# Patient Record
Sex: FEMALE | Race: WHITE | NOT HISPANIC OR LATINO | Employment: UNEMPLOYED | URBAN - METROPOLITAN AREA
[De-identification: names, ages, dates, MRNs, and addresses within clinical notes are randomized per-mention and may not be internally consistent; named-entity substitution may affect disease eponyms.]

---

## 2017-03-21 ENCOUNTER — OFFICE VISIT - HEALTHEAST (OUTPATIENT)
Dept: INTERNAL MEDICINE | Facility: CLINIC | Age: 57
End: 2017-03-21

## 2017-03-21 DIAGNOSIS — Z12.83 SKIN CANCER SCREENING: ICD-10-CM

## 2017-03-21 DIAGNOSIS — E78.5 HLD (HYPERLIPIDEMIA): ICD-10-CM

## 2017-03-21 DIAGNOSIS — Z00.00 ANNUAL PHYSICAL EXAM: ICD-10-CM

## 2017-03-21 LAB
CHOLEST SERPL-MCNC: 253 MG/DL
FASTING STATUS PATIENT QL REPORTED: YES
HDLC SERPL-MCNC: 77 MG/DL
LDLC SERPL CALC-MCNC: 162 MG/DL
TRIGL SERPL-MCNC: 71 MG/DL

## 2017-03-21 ASSESSMENT — MIFFLIN-ST. JEOR: SCORE: 1258.4

## 2017-03-22 ENCOUNTER — COMMUNICATION - HEALTHEAST (OUTPATIENT)
Dept: INTERNAL MEDICINE | Facility: CLINIC | Age: 57
End: 2017-03-22

## 2017-03-24 LAB
HPV INTERPRETATION - HISTORICAL: NORMAL
HPV INTERPRETER - HISTORICAL: NORMAL

## 2017-03-27 LAB
BKR LAB AP ABNORMAL BLEEDING: NO
BKR LAB AP BIRTH CONTROL/HORMONES: NORMAL
BKR LAB AP CERVICAL APPEARANCE: NORMAL
BKR LAB AP GYN ADEQUACY: NORMAL
BKR LAB AP GYN INTERPRETATION: NORMAL
BKR LAB AP HPV REFLEX: NORMAL
BKR LAB AP LMP: NORMAL
BKR LAB AP PATIENT STATUS: NORMAL
BKR LAB AP PREVIOUS ABNORMAL: NORMAL
BKR LAB AP PREVIOUS NORMAL: 2011
HIGH RISK?: NO
PATH REPORT.COMMENTS IMP SPEC: NORMAL
RESULT FLAG (HE HISTORICAL CONVERSION): NORMAL

## 2017-06-14 ENCOUNTER — COMMUNICATION - HEALTHEAST (OUTPATIENT)
Dept: INTERNAL MEDICINE | Facility: CLINIC | Age: 57
End: 2017-06-14

## 2017-06-28 ENCOUNTER — COMMUNICATION - HEALTHEAST (OUTPATIENT)
Dept: ADMINISTRATIVE | Facility: CLINIC | Age: 57
End: 2017-06-28

## 2018-02-01 ENCOUNTER — COMMUNICATION - HEALTHEAST (OUTPATIENT)
Dept: INTERNAL MEDICINE | Facility: CLINIC | Age: 58
End: 2018-02-01

## 2018-02-21 ENCOUNTER — COMMUNICATION - HEALTHEAST (OUTPATIENT)
Dept: INTERNAL MEDICINE | Facility: CLINIC | Age: 58
End: 2018-02-21

## 2018-03-29 ENCOUNTER — COMMUNICATION - HEALTHEAST (OUTPATIENT)
Dept: INTERNAL MEDICINE | Facility: CLINIC | Age: 58
End: 2018-03-29

## 2018-09-28 ENCOUNTER — HOSPITAL ENCOUNTER (OUTPATIENT)
Dept: MAMMOGRAPHY | Facility: CLINIC | Age: 58
Discharge: HOME | End: 2018-09-28

## 2018-09-28 DIAGNOSIS — Z12.31 VISIT FOR SCREENING MAMMOGRAM: ICD-10-CM

## 2019-01-04 ENCOUNTER — COMMUNICATION - HEALTHEAST (OUTPATIENT)
Dept: SCHEDULING | Facility: CLINIC | Age: 59
End: 2019-01-04

## 2019-01-08 ENCOUNTER — OFFICE VISIT - HEALTHEAST (OUTPATIENT)
Dept: INTERNAL MEDICINE | Facility: CLINIC | Age: 59
End: 2019-01-08

## 2019-01-08 DIAGNOSIS — I10 BENIGN ESSENTIAL HYPERTENSION: ICD-10-CM

## 2019-01-08 LAB
ANION GAP SERPL CALCULATED.3IONS-SCNC: 12 MMOL/L (ref 5–18)
BASOPHILS # BLD AUTO: 0 THOU/UL (ref 0–0.2)
BASOPHILS NFR BLD AUTO: 1 % (ref 0–2)
BUN SERPL-MCNC: 22 MG/DL (ref 8–22)
CALCIUM SERPL-MCNC: 10.1 MG/DL (ref 8.5–10.5)
CHLORIDE BLD-SCNC: 104 MMOL/L (ref 98–107)
CHOLEST SERPL-MCNC: 289 MG/DL
CO2 SERPL-SCNC: 23 MMOL/L (ref 22–31)
CREAT SERPL-MCNC: 0.89 MG/DL (ref 0.6–1.1)
EOSINOPHIL # BLD AUTO: 0.1 THOU/UL (ref 0–0.4)
EOSINOPHIL NFR BLD AUTO: 2 % (ref 0–6)
ERYTHROCYTE [DISTWIDTH] IN BLOOD BY AUTOMATED COUNT: 10.3 % (ref 11–14.5)
FASTING STATUS PATIENT QL REPORTED: NO
GFR SERPL CREATININE-BSD FRML MDRD: >60 ML/MIN/1.73M2
GLUCOSE BLD-MCNC: 100 MG/DL (ref 70–125)
HCT VFR BLD AUTO: 45.4 % (ref 35–47)
HDLC SERPL-MCNC: 71 MG/DL
HGB BLD-MCNC: 15.3 G/DL (ref 12–16)
LDLC SERPL CALC-MCNC: 189 MG/DL
LYMPHOCYTES # BLD AUTO: 1.2 THOU/UL (ref 0.8–4.4)
LYMPHOCYTES NFR BLD AUTO: 21 % (ref 20–40)
MCH RBC QN AUTO: 32 PG (ref 27–34)
MCHC RBC AUTO-ENTMCNC: 33.7 G/DL (ref 32–36)
MCV RBC AUTO: 95 FL (ref 80–100)
MONOCYTES # BLD AUTO: 0.5 THOU/UL (ref 0–0.9)
MONOCYTES NFR BLD AUTO: 9 % (ref 2–10)
NEUTROPHILS # BLD AUTO: 3.9 THOU/UL (ref 2–7.7)
NEUTROPHILS NFR BLD AUTO: 68 % (ref 50–70)
PLATELET # BLD AUTO: 215 THOU/UL (ref 140–440)
PMV BLD AUTO: 7.7 FL (ref 7–10)
POTASSIUM BLD-SCNC: 4.5 MMOL/L (ref 3.5–5)
RBC # BLD AUTO: 4.78 MILL/UL (ref 3.8–5.4)
SODIUM SERPL-SCNC: 139 MMOL/L (ref 136–145)
TRIGL SERPL-MCNC: 144 MG/DL
WBC: 5.8 THOU/UL (ref 4–11)

## 2019-01-08 ASSESSMENT — MIFFLIN-ST. JEOR: SCORE: 1279.26

## 2019-01-09 ENCOUNTER — COMMUNICATION - HEALTHEAST (OUTPATIENT)
Dept: INTERNAL MEDICINE | Facility: CLINIC | Age: 59
End: 2019-01-09

## 2019-01-09 DIAGNOSIS — E78.49 OTHER HYPERLIPIDEMIA: ICD-10-CM

## 2019-02-07 ENCOUNTER — OFFICE VISIT - HEALTHEAST (OUTPATIENT)
Dept: INTERNAL MEDICINE | Facility: CLINIC | Age: 59
End: 2019-02-07

## 2019-02-07 DIAGNOSIS — E78.49 OTHER HYPERLIPIDEMIA: ICD-10-CM

## 2019-02-07 DIAGNOSIS — I10 BENIGN ESSENTIAL HYPERTENSION: ICD-10-CM

## 2019-02-26 ENCOUNTER — AMBULATORY - HEALTHEAST (OUTPATIENT)
Dept: LAB | Facility: CLINIC | Age: 59
End: 2019-02-26

## 2019-02-26 DIAGNOSIS — E78.49 OTHER HYPERLIPIDEMIA: ICD-10-CM

## 2019-02-26 DIAGNOSIS — I10 BENIGN ESSENTIAL HYPERTENSION: ICD-10-CM

## 2019-02-26 LAB
ALT SERPL W P-5'-P-CCNC: 24 U/L (ref 0–45)
ANION GAP SERPL CALCULATED.3IONS-SCNC: 12 MMOL/L (ref 5–18)
AST SERPL W P-5'-P-CCNC: 17 U/L (ref 0–40)
BUN SERPL-MCNC: 14 MG/DL (ref 8–22)
CALCIUM SERPL-MCNC: 9.4 MG/DL (ref 8.5–10.5)
CHLORIDE BLD-SCNC: 106 MMOL/L (ref 98–107)
CHOLEST SERPL-MCNC: 219 MG/DL
CO2 SERPL-SCNC: 23 MMOL/L (ref 22–31)
CREAT SERPL-MCNC: 0.87 MG/DL (ref 0.6–1.1)
FASTING STATUS PATIENT QL REPORTED: YES
GFR SERPL CREATININE-BSD FRML MDRD: >60 ML/MIN/1.73M2
GLUCOSE BLD-MCNC: 85 MG/DL (ref 70–125)
HDLC SERPL-MCNC: 73 MG/DL
LDLC SERPL CALC-MCNC: 112 MG/DL
POTASSIUM BLD-SCNC: 5 MMOL/L (ref 3.5–5)
SODIUM SERPL-SCNC: 141 MMOL/L (ref 136–145)
TRIGL SERPL-MCNC: 172 MG/DL

## 2019-02-27 ENCOUNTER — COMMUNICATION - HEALTHEAST (OUTPATIENT)
Dept: INTERNAL MEDICINE | Facility: CLINIC | Age: 59
End: 2019-02-27

## 2019-04-24 ENCOUNTER — COMMUNICATION - HEALTHEAST (OUTPATIENT)
Dept: INTERNAL MEDICINE | Facility: CLINIC | Age: 59
End: 2019-04-24

## 2019-04-25 ENCOUNTER — COMMUNICATION - HEALTHEAST (OUTPATIENT)
Dept: PEDIATRICS | Facility: CLINIC | Age: 59
End: 2019-04-25

## 2019-04-25 ENCOUNTER — AMBULATORY - HEALTHEAST (OUTPATIENT)
Dept: NURSING | Facility: CLINIC | Age: 59
End: 2019-04-25

## 2019-07-28 ENCOUNTER — COMMUNICATION - HEALTHEAST (OUTPATIENT)
Dept: INTERNAL MEDICINE | Facility: CLINIC | Age: 59
End: 2019-07-28

## 2019-07-28 DIAGNOSIS — I10 BENIGN ESSENTIAL HYPERTENSION: ICD-10-CM

## 2019-08-12 ENCOUNTER — COMMUNICATION - HEALTHEAST (OUTPATIENT)
Dept: INTERNAL MEDICINE | Facility: CLINIC | Age: 59
End: 2019-08-12

## 2019-08-12 DIAGNOSIS — E78.49 OTHER HYPERLIPIDEMIA: ICD-10-CM

## 2019-10-24 ENCOUNTER — COMMUNICATION - HEALTHEAST (OUTPATIENT)
Dept: INTERNAL MEDICINE | Facility: CLINIC | Age: 59
End: 2019-10-24

## 2019-10-24 DIAGNOSIS — I10 BENIGN ESSENTIAL HYPERTENSION: ICD-10-CM

## 2020-02-05 ENCOUNTER — COMMUNICATION - HEALTHEAST (OUTPATIENT)
Dept: INTERNAL MEDICINE | Facility: CLINIC | Age: 60
End: 2020-02-05

## 2020-02-05 DIAGNOSIS — I10 BENIGN ESSENTIAL HYPERTENSION: ICD-10-CM

## 2020-02-05 DIAGNOSIS — E78.49 OTHER HYPERLIPIDEMIA: ICD-10-CM

## 2020-02-24 ENCOUNTER — AMBULATORY - HEALTHEAST (OUTPATIENT)
Dept: INTERNAL MEDICINE | Facility: CLINIC | Age: 60
End: 2020-02-24

## 2020-02-26 ENCOUNTER — OFFICE VISIT - HEALTHEAST (OUTPATIENT)
Dept: INTERNAL MEDICINE | Facility: CLINIC | Age: 60
End: 2020-02-26

## 2020-02-26 DIAGNOSIS — Z12.11 SCREEN FOR COLON CANCER: ICD-10-CM

## 2020-02-26 DIAGNOSIS — I10 ESSENTIAL HYPERTENSION: ICD-10-CM

## 2020-02-26 DIAGNOSIS — E78.49 OTHER HYPERLIPIDEMIA: ICD-10-CM

## 2020-02-26 DIAGNOSIS — Z00.00 ANNUAL PHYSICAL EXAM: ICD-10-CM

## 2020-02-26 LAB
ALT SERPL W P-5'-P-CCNC: 18 U/L (ref 0–45)
ANION GAP SERPL CALCULATED.3IONS-SCNC: 11 MMOL/L (ref 5–18)
AST SERPL W P-5'-P-CCNC: 15 U/L (ref 0–40)
BUN SERPL-MCNC: 15 MG/DL (ref 8–22)
CALCIUM SERPL-MCNC: 9.7 MG/DL (ref 8.5–10.5)
CHLORIDE BLD-SCNC: 103 MMOL/L (ref 98–107)
CHOLEST SERPL-MCNC: 238 MG/DL
CO2 SERPL-SCNC: 25 MMOL/L (ref 22–31)
CREAT SERPL-MCNC: 0.84 MG/DL (ref 0.6–1.1)
FASTING STATUS PATIENT QL REPORTED: YES
GFR SERPL CREATININE-BSD FRML MDRD: >60 ML/MIN/1.73M2
GLUCOSE BLD-MCNC: 84 MG/DL (ref 70–125)
HDLC SERPL-MCNC: 69 MG/DL
LDLC SERPL CALC-MCNC: 141 MG/DL
POTASSIUM BLD-SCNC: 4.8 MMOL/L (ref 3.5–5)
SODIUM SERPL-SCNC: 139 MMOL/L (ref 136–145)
TRIGL SERPL-MCNC: 139 MG/DL

## 2020-02-26 ASSESSMENT — MIFFLIN-ST. JEOR: SCORE: 1252.05

## 2020-04-10 ENCOUNTER — AMBULATORY - HEALTHEAST (OUTPATIENT)
Dept: INTERNAL MEDICINE | Facility: CLINIC | Age: 60
End: 2020-04-10

## 2020-04-10 ENCOUNTER — COMMUNICATION - HEALTHEAST (OUTPATIENT)
Dept: INTERNAL MEDICINE | Facility: CLINIC | Age: 60
End: 2020-04-10

## 2020-04-10 DIAGNOSIS — E78.49 OTHER HYPERLIPIDEMIA: ICD-10-CM

## 2020-07-09 ENCOUNTER — COMMUNICATION - HEALTHEAST (OUTPATIENT)
Dept: INTERNAL MEDICINE | Facility: CLINIC | Age: 60
End: 2020-07-09

## 2020-07-10 ENCOUNTER — AMBULATORY - HEALTHEAST (OUTPATIENT)
Dept: LAB | Facility: CLINIC | Age: 60
End: 2020-07-10

## 2020-07-10 DIAGNOSIS — E78.49 OTHER HYPERLIPIDEMIA: ICD-10-CM

## 2020-07-10 LAB
ALT SERPL W P-5'-P-CCNC: 19 U/L (ref 0–45)
AST SERPL W P-5'-P-CCNC: 16 U/L (ref 0–40)
CHOLEST SERPL-MCNC: 217 MG/DL
FASTING STATUS PATIENT QL REPORTED: YES
HDLC SERPL-MCNC: 70 MG/DL
LDLC SERPL CALC-MCNC: 103 MG/DL
TRIGL SERPL-MCNC: 221 MG/DL

## 2020-07-13 ENCOUNTER — AMBULATORY - HEALTHEAST (OUTPATIENT)
Dept: INTERNAL MEDICINE | Facility: CLINIC | Age: 60
End: 2020-07-13

## 2020-07-13 DIAGNOSIS — E78.49 OTHER HYPERLIPIDEMIA: ICD-10-CM

## 2021-03-25 ENCOUNTER — HOSPITAL ENCOUNTER (OUTPATIENT)
Dept: MAMMOGRAPHY | Facility: CLINIC | Age: 61
Discharge: HOME | End: 2021-03-25

## 2021-03-25 DIAGNOSIS — Z12.31 VISIT FOR SCREENING MAMMOGRAM: ICD-10-CM

## 2021-03-26 ENCOUNTER — COMMUNICATION - HEALTHEAST (OUTPATIENT)
Dept: INTERNAL MEDICINE | Facility: CLINIC | Age: 61
End: 2021-03-26

## 2021-03-31 ENCOUNTER — OFFICE VISIT - HEALTHEAST (OUTPATIENT)
Dept: INTERNAL MEDICINE | Facility: CLINIC | Age: 61
End: 2021-03-31

## 2021-03-31 DIAGNOSIS — I10 ESSENTIAL HYPERTENSION: ICD-10-CM

## 2021-03-31 DIAGNOSIS — Z12.11 SCREEN FOR COLON CANCER: ICD-10-CM

## 2021-03-31 DIAGNOSIS — N62 LARGE BREASTS: ICD-10-CM

## 2021-03-31 DIAGNOSIS — Z00.00 ANNUAL PHYSICAL EXAM: ICD-10-CM

## 2021-03-31 DIAGNOSIS — E78.49 OTHER HYPERLIPIDEMIA: ICD-10-CM

## 2021-03-31 LAB
ALBUMIN SERPL-MCNC: 4.2 G/DL (ref 3.5–5)
ALP SERPL-CCNC: 84 U/L (ref 45–120)
ALT SERPL W P-5'-P-CCNC: 20 U/L (ref 0–45)
ANION GAP SERPL CALCULATED.3IONS-SCNC: 11 MMOL/L (ref 5–18)
AST SERPL W P-5'-P-CCNC: 17 U/L (ref 0–40)
BILIRUB DIRECT SERPL-MCNC: 0.1 MG/DL
BILIRUB SERPL-MCNC: 0.5 MG/DL (ref 0–1)
BUN SERPL-MCNC: 19 MG/DL (ref 8–22)
CALCIUM SERPL-MCNC: 9.5 MG/DL (ref 8.5–10.5)
CHLORIDE BLD-SCNC: 102 MMOL/L (ref 98–107)
CHOLEST SERPL-MCNC: 222 MG/DL
CO2 SERPL-SCNC: 26 MMOL/L (ref 22–31)
CREAT SERPL-MCNC: 0.84 MG/DL (ref 0.6–1.1)
FASTING STATUS PATIENT QL REPORTED: NO
GFR SERPL CREATININE-BSD FRML MDRD: >60 ML/MIN/1.73M2
GLUCOSE BLD-MCNC: 105 MG/DL (ref 70–125)
HDLC SERPL-MCNC: 69 MG/DL
LDLC SERPL CALC-MCNC: 117 MG/DL
POTASSIUM BLD-SCNC: 4.1 MMOL/L (ref 3.5–5)
PROT SERPL-MCNC: 7.1 G/DL (ref 6–8)
SODIUM SERPL-SCNC: 139 MMOL/L (ref 136–145)
TRIGL SERPL-MCNC: 182 MG/DL

## 2021-03-31 ASSESSMENT — MIFFLIN-ST. JEOR: SCORE: 1256.58

## 2021-04-02 ENCOUNTER — AMBULATORY - HEALTHEAST (OUTPATIENT)
Dept: NURSING | Facility: CLINIC | Age: 61
End: 2021-04-02

## 2021-04-05 ENCOUNTER — TRANSCRIBE ORDERS (OUTPATIENT)
Dept: OTHER | Age: 61
End: 2021-04-05

## 2021-04-05 DIAGNOSIS — N62 LARGE BREASTS: Primary | ICD-10-CM

## 2021-04-08 ENCOUNTER — COMMUNICATION - HEALTHEAST (OUTPATIENT)
Dept: INTERNAL MEDICINE | Facility: CLINIC | Age: 61
End: 2021-04-08

## 2021-04-08 DIAGNOSIS — E78.49 OTHER HYPERLIPIDEMIA: ICD-10-CM

## 2021-04-08 DIAGNOSIS — I10 ESSENTIAL HYPERTENSION: ICD-10-CM

## 2021-04-08 RX ORDER — LISINOPRIL 10 MG/1
10 TABLET ORAL DAILY
Qty: 90 TABLET | Refills: 3 | Status: SHIPPED | OUTPATIENT
Start: 2021-04-08 | End: 2022-04-18

## 2021-04-08 RX ORDER — ATORVASTATIN CALCIUM 40 MG/1
40 TABLET, FILM COATED ORAL DAILY
Qty: 90 TABLET | Refills: 3 | Status: SHIPPED | OUTPATIENT
Start: 2021-04-08 | End: 2022-04-18

## 2021-04-23 ENCOUNTER — AMBULATORY - HEALTHEAST (OUTPATIENT)
Dept: NURSING | Facility: CLINIC | Age: 61
End: 2021-04-23

## 2021-05-25 ENCOUNTER — RECORDS - HEALTHEAST (OUTPATIENT)
Dept: ADMINISTRATIVE | Facility: CLINIC | Age: 61
End: 2021-05-25

## 2021-05-28 NOTE — TELEPHONE ENCOUNTER
New Appointment Needed  What is the reason for the visit:    2nd Shingrix     Provider Preference: Nurse only visit     How soon do you need to be seen?: Asap    Waitlist offered?: No    Okay to leave a detailed message:  Yes

## 2021-05-28 NOTE — TELEPHONE ENCOUNTER
Patient Returning Call  Reason for call:  Patient called back  Information relayed to patient:  Relayed message below, and assisted in scheduling patient for a nurse only, today at 4:40pm  Patient has additional questions:  No  If YES, what are your questions/concerns:  N/A  Okay to leave a detailed message?: No call back needed

## 2021-05-28 NOTE — TELEPHONE ENCOUNTER
Called and Left message for pt to call back - please request a time and date that patient can come in on Tues-thurs - no nurse visits on Monday or Friday.  Pt can be scheduled for [facility] if able to do so.  Please send to clinic if unable to assist.

## 2021-05-30 VITALS — HEIGHT: 64 IN | WEIGHT: 156.4 LBS | BODY MASS INDEX: 26.7 KG/M2

## 2021-05-30 NOTE — TELEPHONE ENCOUNTER
Refill Approved    Rx renewed per Medication Renewal Policy. Medication was last renewed on 2/7/19.    Stefani Malloy, [facility] Connection Triage/Med Refill 7/28/2019     Requested Prescriptions   Pending Prescriptions Disp Refills     lisinopril (PRINIVIL,ZESTRIL) 10 MG tablet [Pharmacy Med Name: LISINOPRIL 10 MG TABLET] 90 tablet 1     Sig: TAKE 1 TABLET BY MOUTH EVERY DAY       Ace Inhibitors Refill Protocol Passed - 7/28/2019  8:45 AM        Passed - PCP or prescribing provider visit in past 12 months       Last office visit with prescriber/PCP: 2/7/2019 Anusha Fermin FNP OR same dept: 2/7/2019 Anusha Fermin FNP OR same specialty: 2/7/2019 Anusha Fermin FNP  Last physical: 3/21/2017 Last MTM visit: Visit date not found   Next visit within 3 mo: Visit date not found  Next physical within 3 mo: Visit date not found  Prescriber OR PCP: JOSE ANTONIO Serrano  Last diagnosis associated with med order: 1. Benign essential hypertension  - lisinopril (PRINIVIL,ZESTRIL) 10 MG tablet [Pharmacy Med Name: LISINOPRIL 10 MG TABLET]; TAKE 1 TABLET BY MOUTH EVERY DAY  Dispense: 90 tablet; Refill: 1    If protocol passes may refill for 12 months if within 3 months of last provider visit (or a total of 15 months).             Passed - Serum Potassium in past 12 months     Lab Results   Component Value Date    Potassium 5.0 02/26/2019             Passed - Blood pressure filed in past 12 months     BP Readings from Last 1 Encounters:   02/07/19 132/80             Passed - Serum Creatinine in past 12 months     Creatinine   Date Value Ref Range Status   02/26/2019 0.87 0.60 - 1.10 mg/dL Final

## 2021-05-31 NOTE — TELEPHONE ENCOUNTER
Refill Approved    Rx renewed per Medication Renewal Policy. Medication was last renewed on 1/9/19  #90 R-1.    Last OV 2/7/19    Sally Duke, [facility] Connection Triage/Med Refill 8/12/2019     Requested Prescriptions   Pending Prescriptions Disp Refills     atorvastatin (LIPITOR) 20 MG tablet 90 tablet 1     Sig: Take 1 tablet (20 mg total) by mouth daily.       Statins Refill Protocol (Hmg CoA Reductase Inhibitors) Passed - 8/12/2019  3:52 PM        Passed - PCP or prescribing provider visit in past 12 months      Last office visit with prescriber/PCP: 2/7/2019 Anusha Fermin FNP OR same dept: 2/7/2019 Anusha Fermin FNP OR same specialty: 2/7/2019 Anusha Fermin FNP  Last physical: 3/21/2017 Last MTM visit: Visit date not found   Next visit within 3 mo: Visit date not found  Next physical within 3 mo: Visit date not found  Prescriber OR PCP: JOSE ANTONIO Serrano  Last diagnosis associated with med order: 1. Other hyperlipidemia  - atorvastatin (LIPITOR) 20 MG tablet; Take 1 tablet (20 mg total) by mouth daily.  Dispense: 90 tablet; Refill: 1    If protocol passes may refill for 12 months if within 3 months of last provider visit (or a total of 15 months).

## 2021-06-02 VITALS — HEIGHT: 64 IN | BODY MASS INDEX: 27.49 KG/M2 | WEIGHT: 161 LBS

## 2021-06-02 VITALS — WEIGHT: 159 LBS | BODY MASS INDEX: 27.08 KG/M2

## 2021-06-02 NOTE — TELEPHONE ENCOUNTER
Refill Approved    Rx renewed per Medication Renewal Policy. Medication was last renewed on 7/28/19.    Frida Cordoba, [facility] Connection Triage/Med Refill 10/24/2019     Requested Prescriptions   Pending Prescriptions Disp Refills     lisinopril (PRINIVIL,ZESTRIL) 10 MG tablet [Pharmacy Med Name: LISINOPRIL 10 MG TABLET] 90 tablet 0     Sig: TAKE 1 TABLET BY MOUTH EVERY DAY       Ace Inhibitors Refill Protocol Passed - 10/24/2019  2:17 AM        Passed - PCP or prescribing provider visit in past 12 months       Last office visit with prescriber/PCP: 2/7/2019 Anusha Fermin FNP OR same dept: 2/7/2019 Anusha Fermin FNP OR same specialty: 2/7/2019 Anusha Fermin FNP  Last physical: 3/21/2017 Last MTM visit: Visit date not found   Next visit within 3 mo: Visit date not found  Next physical within 3 mo: Visit date not found  Prescriber OR PCP: JOSE ANTONIO Serrano  Last diagnosis associated with med order: 1. Benign essential hypertension  - lisinopril (PRINIVIL,ZESTRIL) 10 MG tablet [Pharmacy Med Name: LISINOPRIL 10 MG TABLET]; TAKE 1 TABLET BY MOUTH EVERY DAY  Dispense: 90 tablet; Refill: 0    If protocol passes may refill for 12 months if within 3 months of last provider visit (or a total of 15 months).             Passed - Serum Potassium in past 12 months     Lab Results   Component Value Date    Potassium 5.0 02/26/2019             Passed - Blood pressure filed in past 12 months     BP Readings from Last 1 Encounters:   02/07/19 132/80             Passed - Serum Creatinine in past 12 months     Creatinine   Date Value Ref Range Status   02/26/2019 0.87 0.60 - 1.10 mg/dL Final

## 2021-06-04 VITALS
HEART RATE: 70 BPM | DIASTOLIC BLOOD PRESSURE: 80 MMHG | HEIGHT: 64 IN | WEIGHT: 155 LBS | SYSTOLIC BLOOD PRESSURE: 126 MMHG | BODY MASS INDEX: 26.46 KG/M2

## 2021-06-05 VITALS
HEART RATE: 74 BPM | DIASTOLIC BLOOD PRESSURE: 82 MMHG | SYSTOLIC BLOOD PRESSURE: 128 MMHG | BODY MASS INDEX: 26.63 KG/M2 | HEIGHT: 64 IN | WEIGHT: 156 LBS

## 2021-06-05 NOTE — TELEPHONE ENCOUNTER
Due to be seen with labs    Rx renewed per Medication Renewal Policy. Medication was last renewed on 8/12/19.10/24/19.    Frida Cordoba, [facility] Connection Triage/Med Refill 2/5/2020     Requested Prescriptions   Pending Prescriptions Disp Refills     lisinopril (PRINIVIL,ZESTRIL) 10 MG tablet [Pharmacy Med Name: LISINOPRIL 10 MG TABLET] 90 tablet 0     Sig: TAKE 1 TABLET BY MOUTH EVERY DAY       Ace Inhibitors Refill Protocol Passed - 2/5/2020  3:22 AM        Passed - PCP or prescribing provider visit in past 12 months       Last office visit with prescriber/PCP: 2/7/2019 Anusha Fermin FNP OR same dept: 2/7/2019 Anusha Fermin FNP OR same specialty: 2/7/2019 Anusha Fermin FNP  Last physical: 3/21/2017 Last MTM visit: Visit date not found   Next visit within 3 mo: Visit date not found  Next physical within 3 mo: Visit date not found  Prescriber OR PCP: JOSE ANTONIO Serrano  Last diagnosis associated with med order: 1. Benign essential hypertension  - lisinopril (PRINIVIL,ZESTRIL) 10 MG tablet [Pharmacy Med Name: LISINOPRIL 10 MG TABLET]; TAKE 1 TABLET BY MOUTH EVERY DAY  Dispense: 90 tablet; Refill: 0    2. Other hyperlipidemia  - atorvastatin (LIPITOR) 20 MG tablet [Pharmacy Med Name: ATORVASTATIN 20 MG TABLET]; TAKE 1 TABLET BY MOUTH EVERY DAY  Dispense: 90 tablet; Refill: 1    If protocol passes may refill for 12 months if within 3 months of last provider visit (or a total of 15 months).             Passed - Serum Potassium in past 12 months     Lab Results   Component Value Date    Potassium 5.0 02/26/2019             Passed - Blood pressure filed in past 12 months     BP Readings from Last 1 Encounters:   02/07/19 132/80             Passed - Serum Creatinine in past 12 months     Creatinine   Date Value Ref Range Status   02/26/2019 0.87 0.60 - 1.10 mg/dL Final             atorvastatin (LIPITOR) 20 MG tablet [Pharmacy Med Name: ATORVASTATIN 20 MG TABLET] 90 tablet 1     Sig: TAKE 1 TABLET BY MOUTH  EVERY DAY       Statins Refill Protocol (Hmg CoA Reductase Inhibitors) Passed - 2/5/2020  3:22 AM        Passed - PCP or prescribing provider visit in past 12 months      Last office visit with prescriber/PCP: 2/7/2019 Anusha Fermin FNP OR same dept: 2/7/2019 Anusha Fermin FNP OR same specialty: 2/7/2019 Anusha Fermin FNP  Last physical: 3/21/2017 Last MTM visit: Visit date not found   Next visit within 3 mo: Visit date not found  Next physical within 3 mo: Visit date not found  Prescriber OR PCP: JOSE ANTONIO Serrano  Last diagnosis associated with med order: 1. Benign essential hypertension  - lisinopril (PRINIVIL,ZESTRIL) 10 MG tablet [Pharmacy Med Name: LISINOPRIL 10 MG TABLET]; TAKE 1 TABLET BY MOUTH EVERY DAY  Dispense: 90 tablet; Refill: 0    2. Other hyperlipidemia  - atorvastatin (LIPITOR) 20 MG tablet [Pharmacy Med Name: ATORVASTATIN 20 MG TABLET]; TAKE 1 TABLET BY MOUTH EVERY DAY  Dispense: 90 tablet; Refill: 1    If protocol passes may refill for 12 months if within 3 months of last provider visit (or a total of 15 months).

## 2021-06-05 NOTE — TELEPHONE ENCOUNTER
Please assist patient with scheduling appointment with her primary for future refills and lab work

## 2021-06-06 NOTE — TELEPHONE ENCOUNTER
Called patient- medications were sent to pharmacy on 2/5/2020. Patient understood and thanked for the call.

## 2021-06-06 NOTE — PROGRESS NOTES
Assessment/Plan:     1. Annual physical exam  - Reviewed the importance of monitoring for changes in breast appearance such as nipple inversion, changes in breast tissue texture, non-healing wounds, or nipple discharge   - regular exercise/healthy diet encouraged   - ALT (SGPT)  - AST (SGOT)  - Basic Metabolic Panel  - Lipid Profile    2. Essential hypertension  Stable   - Basic Metabolic Panel    3. Other hyperlipidemia  Continue statin  - Lipid Profile  - atorvastatin (LIPITOR) 20 MG tablet; Take 1 tablet (20 mg total) by mouth daily.  Dispense: 90 tablet; Refill: 3    4. Screen for colon cancer  - Ambulatory referral for Colonoscopy        Subjective:     Monse Park is a 59 y.o. female who presents for an annual exam and medications refilled.     Blood pressure is well controlled with current medication. No lightheadedness or dizziness associated with treatment.     She continues a statin for HLD, no myalgias associated with the medication.     The patient reports that there is not domestic violence in her life.     Healthy Habits:   Regular Exercise: Yes, swims regularly   Sunscreen Use: Yes, face makeup with sunscreen. Dermatology check last year   Healthy Diet: Yes  Dental Visits Regularly: Yes  Mammogram: 9/28/18      Health Maintenance   Topic Date Due     PREVENTIVE CARE VISIT  03/21/2018     COLONOSCOPY  04/23/2020     MAMMOGRAM  09/28/2020     TD 18+ HE  03/01/2021     PAP SMEAR  03/21/2022     HPV TEST  03/21/2022     ADVANCE CARE PLANNING  02/07/2024     LIPID  02/26/2025     INFLUENZA VACCINE RULE BASED  Completed     TDAP ADULT ONE TIME DOSE  Completed     ZOSTER VACCINES  Completed     HEPATITIS C SCREENING  Discontinued     HIV SCREENING  Discontinued       Immunization History   Administered Date(s) Administered     DT (pediatric) 01/01/1998     Influenza, inj, historic,unspecified 08/30/2018     Td,adult,historic,unspecified 01/01/1998     Tdap 03/01/2011     ZOSTER, RECOMBINANT, IM  2019, 2019     Immunization status: up to date and documented.    Gynecologic History  Patient's last menstrual period was 2013.  Last Pap: 2017. Results were: normal HPV testing: negative    OB History    Para Term  AB Living   3 3 3         SAB TAB Ectopic Multiple Live Births                  # Outcome Date GA Lbr Pillo/2nd Weight Sex Delivery Anes PTL Lv   3 Term            2 Term            1 Term                Current Outpatient Medications   Medication Sig Dispense Refill     atorvastatin (LIPITOR) 20 MG tablet Take 1 tablet (20 mg total) by mouth daily. 90 tablet 3     lisinopriL (PRINIVIL,ZESTRIL) 10 MG tablet Take 1 tablet (10 mg total) by mouth daily. 90 tablet 3     UNABLE TO FIND Med Name: [employer] hair and nails       No current facility-administered medications for this visit.      Past Medical History:   Diagnosis Date     Fibrocystic breast      Past Surgical History:   Procedure Laterality Date     no surgical history       Patient has no known allergies.  Family History   Problem Relation Age of Onset     Heart disease Father      Hypertension Mother      Pacemaker Mother      Hypertension Sister      No Medical Problems Daughter      No Medical Problems Maternal Grandmother      No Medical Problems Maternal Grandfather      No Medical Problems Paternal Grandmother      No Medical Problems Paternal Grandfather      No Medical Problems Maternal Aunt      No Medical Problems Paternal Aunt      Hypertension Brother      Hypertension Sister      Hypertension Sister      Hypertension Sister      No Medical Problems Sister      Hypertension Brother      Social History     Socioeconomic History     Marital status:      Spouse name: Not on file     Number of children: Not on file     Years of education: Not on file     Highest education level: Not on file   Occupational History     Not on file   Social Needs     Financial resource strain: Not on file     Food  insecurity:     Worry: Not on file     Inability: Not on file     Transportation needs:     Medical: Not on file     Non-medical: Not on file   Tobacco Use     Smoking status: Former Smoker     Packs/day: 0.25     Years: 30.00     Pack years: 7.50     Last attempt to quit: 2009     Years since quittin.1     Smokeless tobacco: Never Used   Substance and Sexual Activity     Alcohol use: Yes     Alcohol/week: 7.0 standard drinks     Types: 7 Glasses of wine per week     Comment: socially on weekend     Drug use: No     Sexual activity: Yes     Partners: Male     Birth control/protection: Post-menopausal   Lifestyle     Physical activity:     Days per week: Not on file     Minutes per session: Not on file     Stress: Not on file   Relationships     Social connections:     Talks on phone: Not on file     Gets together: Not on file     Attends Sabianist service: Not on file     Active member of club or organization: Not on file     Attends meetings of clubs or organizations: Not on file     Relationship status: Not on file     Intimate partner violence:     Fear of current or ex partner: Not on file     Emotionally abused: Not on file     Physically abused: Not on file     Forced sexual activity: Not on file   Other Topics Concern     Not on file   Social History Narrative     Not on file       Review of Systems  General:  Negative except as noted above  Eyes: Negative except as noted above  Ears/Nose/Throat: Negative except as noted above  Cardiovascular: Negative except as noted above  Respiratory:  Negative except as noted above  Gastrointestinal:  Negative except as noted above  Musculoskeletal:  Negative except as noted above  Skin: Negative except as noted above  Neurologic: Negative except as noted above  Psychiatric: Negative except as noted above  Endocrine: Negative except as noted above  Heme/Lymphatic: Negative except as noted above   Allergic/Immunologic: Negative except as noted  "above      Objective:      Vitals:    02/26/20 0725   BP: 126/80   Pulse: 70   Weight: 155 lb (70.3 kg)   Height: 5' 4.25\" (1.632 m)     Wt Readings from Last 3 Encounters:   02/26/20 155 lb (70.3 kg)   02/07/19 159 lb (72.1 kg)   01/08/19 161 lb (73 kg)     Body mass index is 26.4 kg/m .     Physical Exam:  General Appearance: Alert, cooperative, no distress.  Head: Normocephalic, without obvious abnormality, atraumatic  Eyes: PERRL, conjunctiva/corneas clear, EOM's intact  Ears: Normal TM's and external ear canals, both ears  Throat: Lips, mucosa, and tongue normal  Neck: Supple, symmetrical, trachea midline, no adenopathy;  thyroid: not enlarged, symmetric, no tenderness/mass/nodules  Lungs: Clear to auscultation bilaterally, respirations unlabored  Breasts: No breast masses, tenderness, asymmetry, or nipple discharge.  Heart: Regular rate and rhythm, S1 and S2 normal, no murmur, rub, or gallop  Abdomen: Soft, non-tender, bowel sounds active all four quadrants,  no masses, no organomegaly  Extremities: Extremities normal, atraumatic, no cyanosis or edema  Skin: Skin color, texture, turgor normal, no rashes or lesions  Lymph nodes: Cervical, supraclavicular, and axillary nodes normal  Neurologic: Normal  Psych: Normal affect.  Does not appear anxious or depressed.         "

## 2021-06-06 NOTE — TELEPHONE ENCOUNTER
Who is calling:  Patient   Reason for Call:  Caller stated that she had set up appointment for a office visit with Anusha Fermin FNP. Caller stated that will be needing a refill to cover until the actual office visit date which is on 02/26/20.   Date of last appointment with primary care: n/a  Okay to leave a detailed message: Yes  [phone]

## 2021-06-07 NOTE — TELEPHONE ENCOUNTER
Call patient: let her know I called in atorvastatin 40 mg daily. Note that this is a change in dose so she will need to take just 1 pill per day. Also, I would like her to schedule a fasting lab follow up visit in 3 months to make sure that her cholesterol has improved and that she is tolerating the medication well.

## 2021-06-07 NOTE — TELEPHONE ENCOUNTER
Refill Request  Did you contact pharmacy: Yes  Medication name: atorvastatin (LIPITOR) 20 MG tablet   Medication   Date: 2/26/2020  Department: [facility]  Ordering/Authorizing: Anusha Fermin FNP    Order Providers     Prescribing Provider  Encounter Provider    Anusha Fermin FNP Roth, Megan Carissa, FNP    Outpatient Medication Detail      Disp  Refills  Start  End     atorvastatin (LIPITOR) 20 MG tablet  90 tablet  3  2/26/2020      Sig - Route: Take 1 tablet (20 mg total) by mouth daily. - Oral     Sent to pharmacy as: atorvastatin 20 mg tablet (LIPITOR)     E-Prescribing Status: Receipt confirmed by pharmacy (2/26/2020  7:39 AM CST)     Associated Diagnoses     Other hyperlipidemia        Pharmacy     [place] [address] [place] [facility] - [place], [place] - [address]        Requested Prescriptions      No prescriptions requested or ordered in this encounter     Who prescribed the medication: Anusha Fermin  Requested Pharmacy: [facility]  Is patient out of medication: No.  3 days left, patient reported that she is now taking 2 tablets per day so is running out of medication sooner & pharmacy said it's too soon to refill.  Patient notified refills processed in 3 business days:  yes  Okay to leave a detailed message: yes

## 2021-06-09 NOTE — PROGRESS NOTES
Assessment/Plan:     1. Annual physical exam  2. Skin cancer screening  3. HLD (hyperlipidemia)  - Reviewed the importance of monitoring for changes in breast appearance such as nipple inversion, changes in breast tissue texture, non-healing wounds, or nipple discharge   -Referral to dermatologist for skin cancer screening as per patient preference. No obvious skin lesions or suspicious lesions noted  -Repeat lipid panel and glucose today.  Her goal is to ream remain off of any medications.  We reviewed her last lipid panel did show quite an elevated LDL for which a statin medication would be recommended.  She declines at the current time but will see what her repeat lab test shows.  - The following high BMI interventions were performed this visit: encouragement to exercise   - Follow up in 2 weeks for repeat blood pressure check         Subjective:     Monse Park is a 56 y.o. female who presents for an annual exam.  She does not have any new concerns today.  She is due for repeat Pap smear today.    The patient reports that there is not domestic violence in her life.     Healthy Habits:   Regular Exercise: Yes, 7 days per week. Swims and cardio exercises   Sunscreen Use: Yes  Healthy Diet: Yes  Dental Visits Regularly: Yes  Sexually active: Yes  Mammogram: 2015, mammogram scheduling information provided to patient   Colonoscopy: Yes, in media   Prevention of Osteoporosis: discussed 1200 mg of calcium and adequate vitamin D    Immunization History   Administered Date(s) Administered     DT (pediatric) 1998     Td, historic 1998     Tdap 2011     Immunization status: UTD and documented     Gynecologic History  Patient's last menstrual period was 2013.  Contraception: post menopausal status  Last Pap: . Results were: normal      OB History    Para Term  AB SAB TAB Ectopic Multiple Living   3 3 3             # Outcome Date GA Lbr Pillo/2nd Weight Sex Delivery  Anes PTL Lv   3 Term            2 Term            1 Term                   Current Outpatient Prescriptions   Medication Sig Dispense Refill     CALCIUM CARBONATE/VITAMIN D3 (CALCIUM 500 + D, D3, ORAL) Take 1 tablet by mouth daily.       UNABLE TO FIND Med Name: [employer] hair and nails       No current facility-administered medications for this visit.      Past Medical History:   Diagnosis Date     Fibrocystic breast      Past Surgical History:   Procedure Laterality Date     no surgical history       Review of patient's allergies indicates no known allergies.  Family History   Problem Relation Age of Onset     Heart disease Father      No Medical Problems Mother      No Medical Problems Sister      No Medical Problems Daughter      No Medical Problems Maternal Grandmother      No Medical Problems Maternal Grandfather      No Medical Problems Paternal Grandmother      No Medical Problems Paternal Grandfather      No Medical Problems Maternal Aunt      No Medical Problems Paternal Aunt      BRCA 1/2 Neg Hx      Breast cancer Neg Hx      Cancer Neg Hx      Colon cancer Neg Hx      Endometrial cancer Neg Hx      Ovarian cancer Neg Hx      Social History     Social History     Marital status:      Spouse name: N/A     Number of children: N/A     Years of education: N/A     Occupational History     Not on file.     Social History Main Topics     Smoking status: Former Smoker     Smokeless tobacco: Not on file     Alcohol use Yes      Comment: socially on weekend     Drug use: No     Sexual activity: Not on file     Other Topics Concern     Not on file     Social History Narrative       Review of Systems  General:  Negative except as noted above  Eyes: Negative except as noted above  Ears/Nose/Throat: Negative except as noted above  Cardiovascular: Negative except as noted above  Respiratory:  Negative except as noted above  Gastrointestinal:  Negative except as noted above  Musculoskeletal:  Negative except  "as noted above  Skin: Negative except as noted above  Neurologic: Negative except as noted above  Psychiatric: Negative except as noted above  Endocrine: Negative except as noted above  Heme/Lymphatic: Negative except as noted above   Allergic/Immunologic: Negative except as noted above      Objective:      Vitals:    03/21/17 1444 03/21/17 1524   BP: 150/90 148/90   Patient Site: Right Arm Right Arm   Patient Position: Sitting Sitting   Cuff Size: Adult Regular Adult Regular   Pulse: 67    Weight: 156 lb 6.4 oz (70.9 kg)    Height: 5' 4.25\" (1.632 m)      Wt Readings from Last 3 Encounters:   03/21/17 156 lb 6.4 oz (70.9 kg)   10/20/16 156 lb (70.8 kg)   12/16/15 157 lb 12.8 oz (71.6 kg)     Body mass index is 26.64 kg/(m^2).     Physical Exam:  General Appearance: Alert, cooperative, no distress.  Head: Normocephalic, without obvious abnormality, atraumatic  Eyes: PERRL, conjunctiva/corneas clear, EOM's intact  Ears: Normal TM's and external ear canals, both ears  Nose: Nares normal, septum midline,mucosa normal, no drainage  Throat: Lips, mucosa, and tongue normal  Neck: Supple, symmetrical, trachea midline, no adenopathy;  thyroid: not enlarged, symmetric, no tenderness/mass/nodules  Lungs: Clear to auscultation bilaterally, respirations unlabored  Heart: Regular rate and rhythm, S1 and S2 normal, no murmur, rub, or gallop  Abdomen: Soft, non-tender, bowel sounds active all four quadrants,  no masses, no organomegaly  Pelvic: Genital: EXTERNAL GENITALIA: Normal appearing vulva without masses, tenderness or lesions. PERINEUM: normal and intact. URETHRAL MEATUS: normal VAGINA:  Vagina atrophic with normal color and without discharge or lesions. CERVIX: atrophic appearing cervix without discharge or lesions. Non-friable. No CMT. UTERUS: uterus is normal size, shape, consistency, and non-tender. ADNEXA: no tenderness or fullness.   Extremities: Extremities normal, atraumatic, no cyanosis or edema  Skin: Skin color, " texture, turgor normal, no rashes or lesions  Lymph nodes: Cervical, supraclavicular nodes normal  Neurologic: Normal  Psych: Normal affect.  Does not appear anxious or depressed.

## 2021-06-09 NOTE — TELEPHONE ENCOUNTER
Who is calling:  Patient   Reason for Call:  Caller is needing to know if she is suppose to fast for 12 hrs or 8 hrs before her lab appointment. Caller is needing a call back as soon as possible due to lab appointment is tomorrow morning.   Date of last appointment with primary care:   Okay to leave a detailed message: Yes

## 2021-06-09 NOTE — TELEPHONE ENCOUNTER
Called to patient and told her to fast 8 hours prior to appointment limiting to water and black coffee. Patient thanked for the call

## 2021-06-16 PROBLEM — I10 HTN (HYPERTENSION): Status: ACTIVE | Noted: 2019-02-27

## 2021-06-16 NOTE — PROGRESS NOTES
Assessment/Plan:       Problem List Items Addressed This Visit     HLD (hyperlipidemia)     Continue statin         Relevant Orders    Lipid Profile (Completed)    Hepatic Profile (Completed)    HTN (hypertension)     Stable. Continue current medication         Relevant Orders    Basic Metabolic Panel (Completed)    JIC LAV (Completed)      Other Visit Diagnoses     Annual physical exam    -  Primary    Screen for colon cancer        Relevant Orders    Ambulatory referral for Colonoscopy    Large breasts        Relevant Orders    Ambulatory referral to Plastic Surgery - [facility]           - Reviewed the importance of monitoring for changes in breast appearance such as nipple inversion, changes in breast tissue texture, non-healing wounds, or nipple discharge   - Plant-based diet and 150 minutes of physical activity per week recommended   - Health screenings and vaccines appropriate for age, biological gender, and risk factors reviewed and ordered as per this discussion   - Due for a tetanus booster. Must be given at least 2 weeks prior to or after covid vaccine       Subjective:     Monse Park is a 60 y.o. female who presents for an annual exam.     She notes lower back pain and wonders if her breast size contribute to her back pain. As she ages it seems to her her breasts continue to get bigger. She typically tries to soak with hot water when her back bothers her and does a lot of home exercises and stretching to help with her back.     HTN is reviewed. No chest pain or dyspnea.     The patient reports that there is not domestic violence in her life.     Healthy Habits:   Regular Exercise: Yes, swims regularly  Sunscreen Use: Yes  Healthy Diet: Yes  Dental Visits Regularly: Yes      Health Maintenance   Topic Date Due     COVID-19 Vaccine (1) Never done     COLORECTAL CANCER SCREENING  04/23/2020     TD 18+ HE  03/01/2021     PAP SMEAR  03/21/2022     HPV TEST  03/21/2022      PREVENTIVE CARE VISIT  2022     MAMMOGRAM  2023     ADVANCE CARE PLANNING  2024     LIPID  [id number]     INFLUENZA VACCINE RULE BASED  Completed     TDAP ADULT ONE TIME DOSE  Completed     ZOSTER VACCINES  Completed     Pneumococcal Vaccine: Pediatrics (0 to 5 Years) and At-Risk Patients (6 to 64 Years)  Aged Out     HEPATITIS B VACCINES  Aged Out     HEPATITIS C SCREENING  Discontinued     HIV SCREENING  Discontinued         Immunization History   Administered Date(s) Administered     DT (pediatric) 1998     Influenza, inj, historic,unspecified 2018, 10/31/2020     Td,adult,historic,unspecified 1998     Tdap 2011     ZOSTER, RECOMBINANT, IM 2019, 2019       Gynecologic History  Patient's last menstrual period was 2013.  Last Pap: 3/2017. Results were: normal HPV testing: negative    OB History    Para Term  AB Living   3 3 3         SAB TAB Ectopic Multiple Live Births                  # Outcome Date GA Lbr Pillo/2nd Weight Sex Delivery Anes PTL Lv   3 Term            2 Term            1 Term                Current Outpatient Medications   Medication Sig Dispense Refill     atorvastatin (LIPITOR) 40 MG tablet Take 1 tablet (40 mg total) by mouth daily. 90 tablet 3     lisinopriL (PRINIVIL,ZESTRIL) 10 MG tablet Take 1 tablet (10 mg total) by mouth daily. 90 tablet 3     No current facility-administered medications for this visit.      Past Medical History:   Diagnosis Date     Fibrocystic breast      Past Surgical History:   Procedure Laterality Date     no surgical history       Patient has no known allergies.  Family History   Problem Relation Age of Onset     Heart disease Father      Hypertension Mother      Pacemaker Mother      Hypertension Sister      Hypertension Brother      Hypertension Sister      Hypertension Sister      Hypertension Sister      Hypertension Brother      Social History     Socioeconomic History     Marital status:  "     Spouse name: Not on file     Number of children: Not on file     Years of education: Not on file     Highest education level: Not on file   Occupational History     Not on file   Social Needs     Financial resource strain: Not on file     Food insecurity     Worry: Not on file     Inability: Not on file     Transportation needs     Medical: Not on file     Non-medical: Not on file   Tobacco Use     Smoking status: Former Smoker     Packs/day: 0.25     Years: 30.00     Pack years: 7.50     Quit date: 2009     Years since quittin.2     Smokeless tobacco: Never Used   Substance and Sexual Activity     Alcohol use: Yes     Alcohol/week: 7.0 standard drinks     Types: 7 Glasses of wine per week     Comment: socially on weekend     Drug use: No     Sexual activity: Yes     Partners: Male     Birth control/protection: Post-menopausal   Lifestyle     Physical activity     Days per week: Not on file     Minutes per session: Not on file     Stress: Not on file   Relationships     Social connections     Talks on phone: Not on file     Gets together: Not on file     Attends Judaism service: Not on file     Active member of club or organization: Not on file     Attends meetings of clubs or organizations: Not on file     Relationship status: Not on file     Intimate partner violence     Fear of current or ex partner: Not on file     Emotionally abused: Not on file     Physically abused: Not on file     Forced sexual activity: Not on file   Other Topics Concern     Not on file   Social History Narrative     Not on file         Objective:      Vitals:    21 1430   BP: 128/82   Pulse: 74   Weight: 156 lb (70.8 kg)   Height: 5' 4.25\" (1.632 m)     Wt Readings from Last 3 Encounters:   21 156 lb (70.8 kg)   20 155 lb (70.3 kg)   19 159 lb (72.1 kg)     Body mass index is 26.57 kg/m .     Physical Exam:  General Appearance: Alert, cooperative, no distress.  Eyes: PERRL, conjunctiva/corneas " clear, EOM's intact  Ears: Normal TM's and external ear canals, both ears  Throat: Lips, mucosa, and tongue normal  Neck: Supple, symmetrical, trachea midline, no adenopathy;  thyroid: not enlarged, symmetric, no tenderness/mass/nodules  Lungs: Clear to auscultation bilaterally, respirations unlabored  Breasts: No breast masses, tenderness, asymmetry, or nipple discharge.  Abdomen: Soft, non-tender, bowel sounds active all four quadrants,  no masses, no organomegaly  Extremities: Extremities normal, atraumatic, no cyanosis or edema  Skin: Skin color normal  Lymph nodes: Cervical, supraclavicular nodes normal  Neurologic: Normal  Psych: Normal affect.  Does not appear anxious or depressed.

## 2021-06-16 NOTE — TELEPHONE ENCOUNTER
Last Office Visit  3/31/2021 Anusha Fermin, JOSE ANTONOI  Notes:  HLD (hyperlipidemia)        Continue statin           Relevant Orders     Lipid Profile (Completed)     Hepatic Profile (Completed)     HTN (hypertension)       Stable. Continue current medication           Relevant Orders     Basic Metabolic Panel (Completed)     JIC LAV (Completed)               Other Visit Diagnoses      Annual physical exam    -  Primary     Screen for colon cancer         Relevant Orders     Ambulatory referral for Colonoscopy     Large breasts         Relevant Orders     Ambulatory referral to Plastic Surgery [facility]             - Reviewed the importance of monitoring for changes in breast appearance such as nipple inversion, changes in breast tissue texture, non-healing wounds, or nipple discharge   - Plant-based diet and 150 minutes of physical activity per week recommended   - Health screenings and vaccines appropriate for age, biological gender, and risk factors reviewed and ordered as per this discussion   - Due for a tetanus booster. Must be given at least 2 weeks prior to or after covid vaccine       Last Filled:  atorvastatin (LIPITOR) 40 MG tablet 90 tablet 3 7/13/2020  No   Sig - Route: Take 1 tablet (40 mg total) by mouth daily. - Oral   Sent to pharmacy as: atorvastatin 40 mg tablet (Lipitor)   E-Prescribing Status: Receipt confirmed by pharmacy (7/13/2020  7:33 AM CDT)     lisinopriL (PRINIVIL,ZESTRIL) 10 MG tablet 90 tablet 3 2/26/2020  No   Sig - Route: Take 1 tablet (10 mg total) by mouth daily. - Oral   Sent to pharmacy as: lisinopriL 10 mg tablet (PRINIVIL,ZESTRIL)   E-Prescribing Status: Receipt confirmed by pharmacy (2/26/2020  7:39 AM CST)       Next OV:  Visit date not found        Medication teed up for provider signature

## 2021-06-17 NOTE — PATIENT INSTRUCTIONS - HE
Patient Instructions by Jeanie Carey CNP at 1/8/2019  8:00 AM     Author: Jeanie Carey CNP Service: -- Author Type: Nurse Practitioner    Filed: 1/8/2019  8:13 AM Encounter Date: 1/8/2019 Status: Addendum    : Jeanie Carey CNP (Nurse Practitioner)    Related Notes: Original Note by Jeanie Carey CNP (Nurse Practitioner) filed at 1/8/2019  8:12 AM         Patient Education     Eating Heart-Healthy Foods  Eating has a big impact on your heart health. In fact, eating healthier can improve several of your heart risks at once. For instance, it helps you manage weight, cholesterol, and blood pressure. Here are ideas to help you make heart-healthy changes without giving up all the foods and flavors you love.  Getting started    Talk with your healthcare provider about eating plans, such as the DASH or Mediterranean diet. You may also be referred to a dietitian.    Change a few things at a time. Give yourself time to get used to a few eating changes before adding more.    Work to create a tasty, healthy eating plan that you can stick to for the rest of your life.    Goals for healthy eating  Below are some tips to improve your eating habits:    Limit saturated fats and trans fats. Saturated fats raise your levels of cholesterol, so keep these fats to a minimum. They are found in foods such as fatty meats, whole milk, cheese, and palm and coconut oils. Avoid trans fats because they lower good cholesterol as well as raise bad cholesterol. Trans fats are most often found in processed foods.    Reduce sodium (salt) intake. Eating too much salt may increase your blood pressure. Limit your sodium intake to 2,300 milligrams (mg) per day (the amount in 1 teaspoon of salt), or less if your healthcare provider recommends it. Dining out less often and eating fewer processed foods are two great ways to decrease the amount of salt you consume.    Managing calories. A calorie is a unit of  energy. Your body burns calories for fuel, but if you eat more calories than your body burns, the extras are stored as fat. Your healthcare provider can help you create a diet plan to manage your calories. This will likely include eating healthier foods as well as exercising regularly. To help you track your progress, keep a diary to record what you eat and how often you exercise.  Choose the right foods  Aim to make these foods staples of your diet. If you have diabetes, you may have different recommendations than what is listed here:    Fruits and vegetables provide plenty of nutrients without a lot of calories. At meals, fill half your plate with these foods. Split the other half of your plate between whole grains and lean protein.    Whole grains are high in fiber and rich in vitamins and nutrients. Good choices include whole-wheat bread, pasta, and brown rice.    Lean proteins give you nutrition with less fat. Good choices include fish, skinless chicken, and beans.    Low-fat or nonfat dairy provides nutrients without a lot of fat. Try low-fat or nonfat milk, cheese, or yogurt.    Healthy fats can be good for you in small amounts. These are unsaturated fats, such as olive oil, nuts, and fish. Try to have at least 2 servings per week of fatty fish, such as salmon, sardines, mackerel, rainbow trout, and albacore tuna. These contain omega-3 fatty acids, which are good for your heart. Flaxseed is another source of a heart-healthy fat.  More on heart-healthy eating  Read food labels  Healthy eating starts at the grocery store. Be sure to pay attention to food labels on packaged foods. Look for products that are high in fiber and protein, and low in saturated fat, cholesterol, and sodium. Avoid products that contain trans fat. And pay close attention to serving size. For instance, if you plan to eat two servings, double all the numbers on the label.  Prepare food right  A key part of healthy cooking is cutting down  on added fat and salt. Look on the internet for lower-fat, lower-sodium recipes. Also, try these tips:    Remove fat from meat and skin from poultry before cooking.    Skim fat from the surface of soups and sauces.    Broil, boil, bake, steam, grill, and microwave food without added fats.    Choose ingredients that spice up your food without adding calories, fat, or sodium. Try these items: horseradish, hot sauce, lemon, mustard, nonfat salad dressings, and vinegar. For salt-free herbs and spices, try basil, cilantro, cinnamon, pepper, and rosemary.  Date Last Reviewed: 10/1/2017    3342-7055 [employer]. [address] [place] [address], [place] [address]. All rights reserved. This information is not intended as a substitute for professional medical care. Always follow your healthcare professional's instructions.

## 2021-06-18 NOTE — LETTER
Letter by Anusha Fermin FNP at      Author: Anusha Fermin FNP Service: -- Author Type: --    Filed:  Encounter Date: 1/9/2019 Status: (Other)       Monse Park  [address] [place] [address]             January 9, 2019         Dear Ms. Park,    Below are the results from your recent visit:     Please see your resent lab results.     Resulted Orders   Basic Metabolic Panel   Result Value Ref Range    Sodium 139 136 - 145 mmol/L    Potassium 4.5 3.5 - 5.0 mmol/L    Chloride 104 98 - 107 mmol/L    CO2 23 22 - 31 mmol/L    Anion Gap, Calculation 12 5 - 18 mmol/L    Glucose 100 70 - 125 mg/dL    Calcium 10.1 8.5 - 10.5 mg/dL    BUN 22 8 - 22 mg/dL    Creatinine 0.89 0.60 - 1.10 mg/dL    GFR MDRD Af Amer >60 >60 mL/min/1.73m2    GFR MDRD Non Af Amer >60 >60 mL/min/1.73m2    Narrative    Fasting Glucose reference range is 70-99 mg/dL per  American Diabetes Association (ADA) guidelines.   Lipid Cascade RANDOM   Result Value Ref Range    Cholesterol 289 (H) <=199 mg/dL    Triglycerides 144 <=149 mg/dL    HDL Cholesterol 71 >=50 mg/dL    LDL Calculated 189 (H) <=129 mg/dL    Patient Fasting > 8hrs? No    HM1 (CBC with Diff)   Result Value Ref Range    WBC 5.8 4.0 - 11.0 thou/uL    RBC 4.78 3.80 - 5.40 mill/uL    Hemoglobin 15.3 12.0 - 16.0 g/dL    Hematocrit 45.4 35.0 - 47.0 %    MCV 95 80 - 100 fL    MCH 32.0 27.0 - 34.0 pg    MCHC 33.7 32.0 - 36.0 g/dL    RDW 10.3 (L) 11.0 - 14.5 %    Platelets 215 140 - 440 thou/uL    MPV 7.7 7.0 - 10.0 fL    Neutrophils % 68 50 - 70 %    Lymphocytes % 21 20 - 40 %    Monocytes % 9 2 - 10 %    Eosinophils % 2 0 - 6 %    Basophils % 1 0 - 2 %    Neutrophils Absolute 3.9 2.0 - 7.7 thou/uL    Lymphocytes Absolute 1.2 0.8 - 4.4 thou/uL    Monocytes Absolute 0.5 0.0 - 0.9 thou/uL    Eosinophils Absolute 0.1 0.0 - 0.4 thou/uL    Basophils Absolute 0.0 0.0 - 0.2 thou/uL       Please call with questions or contact us using  [place].    Sincerely,        Electronically signed by JOSE ANTONIO Serrano

## 2021-06-20 ENCOUNTER — HEALTH MAINTENANCE LETTER (OUTPATIENT)
Age: 61
End: 2021-06-20

## 2021-06-20 NOTE — LETTER
Letter by Anusha Fermin FNP at      Author: Anusha Fermin FNP Service: -- Author Type: --    Filed:  Encounter Date: 2/5/2020 Status: (Other)         Monse Park  [address] [place] [address]      02/06/20      Dear [place],      In reviewing your records, we have determined a medication check is needed before your next refill, please call the [facility] to schedule an appointment.      Medication check/review      We have made attempts to call you for an appointment, please verify your contact information is correct when calling back for an appointment, or if you have transferred your care to another clinic, please contact us so we can update our records.     Please call [phone] to schedule an appointment.    We believe that a strong preventative care program, including regular physicals and follow-up care is an important part of a healthy lifestyle and we are committed to helping you maintain your health.    Thank you for choosing us as your health care provider.    Sincerely,    Yojana Naik CMA - CMT/CA  [facility]  [address] [place] [id number] [phone]

## 2021-06-22 NOTE — TELEPHONE ENCOUNTER
"Triage call:   Patient calling to report an elevated BP. Went to get a tooth pulled yesterday and would not pull tooth d/t high bp (DBP was 118- only number she provided from yesterday BP)   Took BP today:  160/92- 7 am today   Not on blood pressure medications.     Triaged to be seen within 2 weeks, reviewed additional care advice with patient. Patient warm transferred to scheduling for appointment. Appointment set with Jeanie Carey on 1/8/19 at 8 am.     Lina Salazar RN [facility] Triage/Med Refill 1/4/2019 9:46 AM     Reason for Disposition    Systolic BP >= 160 OR Diastolic >= 100    Answer Assessment - Initial Assessment Questions  1. BLOOD PRESSURE: \"What is the blood pressure?\" \"Did you take at least two measurements 5 minutes apart?\"      160/92 at 7 am today, at work currently unable to take another pressure  2. ONSET: \"When did you take your blood pressure?\"      7 am  3. HOW: \"How did you obtain the blood pressure?\" (e.g., visiting nurse, automatic home BP monitor)      Auto home BP machine  4. HISTORY: \"Do you have a history of high blood pressure?\"      Last office visit BP was borderline   5. MEDICATIONS: \"Are you taking any medications for blood pressure?\" \"Have you missed any doses recently?\"      no  6. OTHER SYMPTOMS: \"Do you have any symptoms?\" (e.g., headache, chest pain, blurred vision, difficulty breathing, weakness)      no  7. PREGNANCY: \"Is there any chance you are pregnant?\" \"When was your last menstrual period?\"      no    Protocols used: HIGH BLOOD PRESSURE-A-OH      "

## 2021-06-22 NOTE — PROGRESS NOTES
Internal Medicine Office Visit  [facility] and [facility]  Patient Name: Monse Park  Patient Age: 58 y.o.  YOB: 1960  MRN: [id number]    Date of Visit: 2019  Reason for Office Visit:   Chief Complaint   Patient presents with     Hypertension     Has been having issues with BP. Is suppose to be having a tooth removed, but BP has been elevated when going in. Has been keeping a record.            Assessment / Plan / Medical Decision Makin. Benign essential hypertension  Recommend following a low-sodium diet.  Education material is provided.  - Basic Metabolic Panel  - Lipid Cascade RANDOM  - HM1(CBC and Differential)  - lisinopril (PRINIVIL,ZESTRIL) 10 MG tablet; Take 1 tablet (10 mg total) by mouth daily.  Dispense: 30 tablet; Refill: 1    Patient is advised if her blood pressure remains elevated with the 10 mg of lisinopril in 2 weeks she may contact my office will increase to 20 mg daily.  She will follow-up with her primary in 30 days, sooner if needed.  All patient's questions addressed she verbalized understanding and agreed with plan.        Health Maintenance Review  Health Maintenance   Topic Date Due     ADVANCE DIRECTIVES DISCUSSED WITH PATIENT  1976     ZOSTER VACCINES (1 of 2) 2008     INFLUENZA VACCINE RULE BASED (1) 2018     COLONOSCOPY  2020     MAMMOGRAM  2020     TD 18+ HE  2021     PAP SMEAR  2022     TDAP ADULT ONE TIME DOSE  Completed         I am having France Park start on lisinopril. I am also having her maintain her (CALCIUM CARBONATE/VITAMIN D3 (CALCIUM 500 + D, D3, ORAL)) and UNABLE TO FIND.      HPI:  Monse Park is a 58 y.o. year old who presents to the office today for concerns for her blood pressure.  Patient had a tooth extraction was noted to have an elevated blood pressure at that time. Patient subsequently contacted our in triage on 2019 at which time she did  take her blood pressure and it was noted to be 160/92.  Patient states she is checking her blood pressure daily at home and notes it continues to remain elevated.  Patient denied any headaches, chest pain, blurred vision difficulty breathing or weakness. Patient is not currently taking any blood pressure medications.  Patient reports that she is exercising on a daily basis.  She reports she is not adding any additional salt to her diet.        Review of Systems- pertinent positive in bold:  Constitutional: Fever, chills, night sweats, fainting, weight change, fatigue, seizures, dizziness, sleeping difficulties, loud snoring/pauses in breathing  Eyes: change in vision, blurred or double vision, redness/eye pain  Ears, nose, mouth, throat: change in hearing, ear pain, hoarseness, difficulty swallowing, sores in the mouth or throat  Respiratory: shortness of breath, cough, bloody sputum, wheezing  Cardiovascular: chest pain, palpitations   Gastrointestinal: abdominal pain, heartburn/indigestion, nausea/vomiting, change in appetite, change in bowel habits, constipation or diarrhea, rectal bleeding/dark stools, difficulty swallowing  Urinary: painful urination, frequent urination, urinary urgency/incontinence, blood in urine/dark urine, nocturia  Musculoskeletal: backache/back pain (new or increasing), weakness, joint pain/stiffness (new or increasing), muscle cramps, swelling of hands, feet, ankles, leg pain/redness and reports occasional right knee pain  Skin: change in moles/freckles, rash, nodules  Hematologic/lymphatic: swollen lymph glands, abnormal bruising/bleeding  Endocrine: excessive thirst/urination, cold or heat intolerance  Neurologic/emotional: worrisome memory change, numbness/tingling, anxiety, mood swings      Current Scheduled Meds:  Outpatient Encounter Medications as of 1/8/2019   Medication Sig Dispense Refill     UNABLE TO FIND Med Name: [employer] hair and nails       CALCIUM  "CARBONATE/VITAMIN D3 (CALCIUM 500 + D, D3, ORAL) Take 1 tablet by mouth daily.       lisinopril (PRINIVIL,ZESTRIL) 10 MG tablet Take 1 tablet (10 mg total) by mouth daily. 30 tablet 1     No facility-administered encounter medications on file as of 1/8/2019.      Past Medical History:   Diagnosis Date     Fibrocystic breast      Past Surgical History:   Procedure Laterality Date     no surgical history       Social History     Tobacco Use     Smoking status: Former Smoker     Packs/day: 0.25     Years: 30.00     Pack years: 7.50     Last attempt to quit: 1/8/2009     Years since quitting: 10.0     Smokeless tobacco: Never Used   Substance Use Topics     Alcohol use: Yes     Alcohol/week: 4.2 oz     Types: 7 Glasses of wine per week     Comment: socially on weekend     Drug use: No       Objective / Physical Examination:  Vitals:    01/08/19 0756   BP: (!) 142/96   Pulse: 84   SpO2: 100%   Weight: 161 lb (73 kg)   Height: 5' 4.25\" (1.632 m)     Wt Readings from Last 3 Encounters:   01/08/19 161 lb (73 kg)   03/21/17 156 lb 6.4 oz (70.9 kg)   10/20/16 156 lb (70.8 kg)     Body mass index is 27.42 kg/m .     General Appearance: Alert and oriented, cooperative, affect appropriate, speech clear, in no apparent distress  Head: Normocephalic, atraumatic  Ears: Tympanic membrane clear with landmarks well visualized bilaterally  Eyes: PERRL, Conjunctivae clear and sclerae non-icteric  Nose: Septum midline, nares patent, no visible polyps, mucosa moist and without drainage  Throat: Lips and mucosa moist. Teeth in good repair, pharynx without erythema or exudate  Neck: Supple, trachea midline. No cervical adenopathy  Lungs: Clear to auscultation bilaterally. Normal inspiratory and expiratory effort  Cardiovascular: Regular rate, normal S1, S2. No murmurs, rubs, or gallops  Extremities: Pulses 2+ and equal throughout. No edema. Strength equal throughout.  Integumentary: Warm and dry. Without suspicious looking lesions  Neuro: " Alert and oriented, follows commands appropriately.     Orders Placed This Encounter   Procedures     Basic Metabolic Panel     Lipid Cascade RANDOM     HM1 (CBC with Diff)   Followup: Return in about 30 days (around 2/7/2019), or if symptoms worsen or fail to improve. earlier if needed.          Jeanie Carey, CNP

## 2021-06-23 NOTE — PROGRESS NOTES
Internal Medicine Office Visit  [facility] and [facility]  Patient Name: Monse Park  Patient Age: 58 y.o.  YOB: 1960  MRN: [id number]    Date of Visit: 2019  Reason for Office Visit:   Chief Complaint   Patient presents with     Follow-up     BP           Assessment / Plan / Medical Decision Makin. Benign essential hypertension  - CONTINUE: lisinopril (PRINIVIL,ZESTRIL) 10 MG tablet; Take 1 tablet (10 mg total) by mouth daily.  Dispense: 90 tablet; Refill: 1  - Basic Metabolic Panel; Future  - Follow up in 6 months     2. Other hyperlipidemia  - repeat labs in another 2-4 weeks   - AST (SGOT); Future  - ALT (SGPT); Future  - Lipid Profile; Future    Shingrix vaccine today, return in 2-6 months for second vaccine     Health Maintenance Review  Health Maintenance   Topic Date Due     ZOSTER VACCINES (1 of 2) 2008     COLONOSCOPY  2020     MAMMOGRAM  2020     TD 18+ HE  2021     PAP SMEAR  2022     ADVANCE DIRECTIVES DISCUSSED WITH PATIENT  2024     INFLUENZA VACCINE RULE BASED  Completed     TDAP ADULT ONE TIME DOSE  Completed         I am having France Park maintain her (CALCIUM CARBONATE/VITAMIN D3 (CALCIUM 500 + D, D3, ORAL)), UNABLE TO FIND, atorvastatin, and lisinopril.      HPI:  Monse Park is a 58 y.o. year old who presents to the office today for follow up of hypertension and hyperlipidemia. She had an elevated blood pressure prior to a dental procedure and was started on lisinopril. She denies lightheadedness or dizziness since starting the medication. Initially missed doses but has found a routine with this now.     HLD reviewed. She recently started atorvastatin, denies myalgias.     Review of Systems- pertinent positive in bold:  Negative for dyspnea, chest pain       Current Scheduled Meds:  Outpatient Encounter Medications as of 2019   Medication Sig Dispense Refill     atorvastatin  (LIPITOR) 20 MG tablet Take 1 tablet (20 mg total) by mouth daily. 90 tablet 1     CALCIUM CARBONATE/VITAMIN D3 (CALCIUM 500 + D, D3, ORAL) Take 1 tablet by mouth daily.       lisinopril (PRINIVIL,ZESTRIL) 10 MG tablet Take 1 tablet (10 mg total) by mouth daily. 90 tablet 1     UNABLE TO FIND Med Name: [employer] hair and nails       [DISCONTINUED] lisinopril (PRINIVIL,ZESTRIL) 10 MG tablet Take 1 tablet (10 mg total) by mouth daily. 30 tablet 1     No facility-administered encounter medications on file as of 2/7/2019.      Past Medical History:   Diagnosis Date     Fibrocystic breast      Past Surgical History:   Procedure Laterality Date     no surgical history       Social History     Tobacco Use     Smoking status: Former Smoker     Packs/day: 0.25     Years: 30.00     Pack years: 7.50     Last attempt to quit: 1/8/2009     Years since quitting: 10.0     Smokeless tobacco: Never Used   Substance Use Topics     Alcohol use: Yes     Alcohol/week: 4.2 oz     Types: 7 Glasses of wine per week     Comment: socially on weekend     Drug use: No       Objective / Physical Examination:  Vitals:    02/07/19 0741   BP: 132/80   Pulse: 74   Weight: 159 lb (72.1 kg)     Wt Readings from Last 3 Encounters:   02/07/19 159 lb (72.1 kg)   01/08/19 161 lb (73 kg)   03/21/17 156 lb 6.4 oz (70.9 kg)     Body mass index is 27.08 kg/m .     General Appearance: Alert and oriented, cooperative, affect appropriate, speech clear, in no apparent distress  Ears: Tympanic membrane clear with landmarks well visualized bilaterally  Neck: Supple, trachea midline. No carotid bruits   Lungs: Clear to auscultation bilaterally. Normal inspiratory and expiratory effort  Cardiovascular: Regular rate, normal S1, S2. No murmurs, rubs, or gallops      Orders Placed This Encounter   Procedures     Varicella Zoster, Recombinant Vaccine IM     AST (SGOT)     ALT (SGPT)     Lipid Profile     Basic Metabolic Panel   Followup: Return in about 6 months  (around 8/7/2019) for Recheck. earlier if needed.      Anusha Fermin, CNP

## 2021-06-30 ENCOUNTER — RECORDS - HEALTHEAST (OUTPATIENT)
Dept: ADMINISTRATIVE | Facility: OTHER | Age: 61
End: 2021-06-30

## 2021-07-21 ENCOUNTER — RECORDS - HEALTHEAST (OUTPATIENT)
Dept: ADMINISTRATIVE | Facility: CLINIC | Age: 61
End: 2021-07-21

## 2021-10-11 ENCOUNTER — HEALTH MAINTENANCE LETTER (OUTPATIENT)
Age: 61
End: 2021-10-11

## 2021-11-16 ENCOUNTER — IMMUNIZATION (OUTPATIENT)
Dept: NURSING | Facility: CLINIC | Age: 61
End: 2021-11-16
Payer: COMMERCIAL

## 2021-11-16 PROCEDURE — 91306 COVID-19,PF,MODERNA (18+ YRS BOOSTER .25ML): CPT

## 2021-11-16 PROCEDURE — 0064A COVID-19,PF,MODERNA (18+ YRS BOOSTER .25ML): CPT

## 2022-04-16 DIAGNOSIS — E78.49 OTHER HYPERLIPIDEMIA: ICD-10-CM

## 2022-04-16 DIAGNOSIS — I10 ESSENTIAL HYPERTENSION: ICD-10-CM

## 2022-04-18 RX ORDER — LISINOPRIL 10 MG/1
TABLET ORAL
Qty: 90 TABLET | Refills: 0 | Status: SHIPPED | OUTPATIENT
Start: 2022-04-18 | End: 2022-07-05

## 2022-04-18 RX ORDER — ATORVASTATIN CALCIUM 40 MG/1
TABLET, FILM COATED ORAL
Qty: 90 TABLET | Refills: 0 | Status: SHIPPED | OUTPATIENT
Start: 2022-04-18 | End: 2022-07-05

## 2022-04-18 NOTE — TELEPHONE ENCOUNTER
"Routing refill request to provider for review/approval because:  Labs not current:  Multiple  Patient needs to be seen because it has been more than 1 year since last office visit.    Last Written Prescription Date:  4/8/21  Last Fill Quantity: 90,  # refills: 3   Last office visit provider:  3/31/21     Requested Prescriptions   Pending Prescriptions Disp Refills     atorvastatin (LIPITOR) 40 MG tablet [Pharmacy Med Name: ATORVASTATIN 40 MG TABLET] 90 tablet 3     Sig: TAKE 1 TABLET BY MOUTH EVERY DAY       Statins Protocol Failed - 4/18/2022 10:35 AM        Failed - LDL on file in past 12 months     Recent Labs   Lab Test 03/31/21  1507                Passed - No abnormal creatine kinase in past 12 months     No lab results found.             Passed - Recent (12 mo) or future (30 days) visit within the authorizing provider's specialty     Patient has had an office visit with the authorizing provider or a provider within the authorizing providers department within the previous 12 mos or has a future within next 30 days. See \"Patient Info\" tab in inbasket, or \"Choose Columns\" in Meds & Orders section of the refill encounter.              Passed - Medication is active on med list        Passed - Patient is age 18 or older        Passed - No active pregnancy on record        Passed - No positive pregnancy test in past 12 months           lisinopril (ZESTRIL) 10 MG tablet [Pharmacy Med Name: LISINOPRIL 10 MG TABLET] 90 tablet 3     Sig: TAKE 1 TABLET BY MOUTH EVERY DAY       ACE Inhibitors (Including Combos) Protocol Failed - 4/18/2022 10:35 AM        Failed - Blood pressure under 140/90 in past 12 months     BP Readings from Last 3 Encounters:   03/31/21 128/82   02/26/20 126/80                 Failed - Normal serum creatinine on file in past 12 months     Recent Labs   Lab Test 03/31/21  1507   CR 0.84       Ok to refill medication if creatinine is low          Failed - Normal serum potassium on file in past " "12 months     Recent Labs   Lab Test 03/31/21  1507   POTASSIUM 4.1             Passed - Recent (12 mo) or future (30 days) visit within the authorizing provider's specialty     Patient has had an office visit with the authorizing provider or a provider within the authorizing providers department within the previous 12 mos or has a future within next 30 days. See \"Patient Info\" tab in inbasket, or \"Choose Columns\" in Meds & Orders section of the refill encounter.              Passed - Medication is active on med list        Passed - Patient is age 18 or older        Passed - No active pregnancy on record        Passed - No positive pregnancy test within past 12 months             Memo Merino RN 04/18/22 10:36 AM  "

## 2022-05-03 ENCOUNTER — TELEPHONE (OUTPATIENT)
Dept: LAB | Facility: CLINIC | Age: 62
End: 2022-05-03
Payer: COMMERCIAL

## 2022-05-03 DIAGNOSIS — I10 PRIMARY HYPERTENSION: Primary | ICD-10-CM

## 2022-05-03 DIAGNOSIS — E78.2 MIXED HYPERLIPIDEMIA: ICD-10-CM

## 2022-05-03 NOTE — PROGRESS NOTES
"This patient has a lab appointment on Tuesday, May 10. The notes say \"blood work ordered by Anusha Fermin in primary\". There are no open/active orders in the chart. Please order/advise. Thank You!    Monse Park has an upcoming lab appointment:    Future Appointments   Date Time Provider Department Center   5/10/2022 11:00 AM [facility]   [facility]10[facility]20[facility]     Patient is scheduled for the following lab(s): none    There is no order available. Please review and place either future orders or HMPO (Review of Health Maintenance Protocol Orders), as appropriate.    Health Maintenance Due   Topic     ANNUAL REVIEW OF HM ORDERS      Kassie Allen    "

## 2022-05-03 NOTE — TELEPHONE ENCOUNTER
Call patient: I did place orders for her to do lab work but she is due for an office visit.  Please schedule for an appointment for follow-up.

## 2022-05-09 ENCOUNTER — MYC MEDICAL ADVICE (OUTPATIENT)
Dept: INTERNAL MEDICINE | Facility: CLINIC | Age: 62
End: 2022-05-09
Payer: COMMERCIAL

## 2022-05-10 NOTE — TELEPHONE ENCOUNTER
LMTCB    Pt is scheduled to come in today for tetanus booster. Pt has not been seen in over a year, and needs to make an appt w/ PCP.     When pt calls back, please assist in scheduling appt.     Thank you,  Sukumar Pichardo Jr., CMA on 5/10/2022 at 8:15 AM

## 2022-05-22 ENCOUNTER — HEALTH MAINTENANCE LETTER (OUTPATIENT)
Age: 62
End: 2022-05-22

## 2022-06-13 ENCOUNTER — TELEPHONE (OUTPATIENT)
Dept: INTERNAL MEDICINE | Facility: CLINIC | Age: 62
End: 2022-06-13
Payer: COMMERCIAL

## 2022-06-13 DIAGNOSIS — Z23 NEED FOR TETANUS BOOSTER: Primary | ICD-10-CM

## 2022-06-13 NOTE — TELEPHONE ENCOUNTER
Pt coming in Thursday for TD vaccination. Two orders are pended. Please verify and sign the correct order that you would like pt to receive.       Thank you,   Sukumar Pichardo Jr., CMA on 6/13/2022 at 8:08 AM

## 2022-06-16 ENCOUNTER — ALLIED HEALTH/NURSE VISIT (OUTPATIENT)
Dept: FAMILY MEDICINE | Facility: CLINIC | Age: 62
End: 2022-06-16
Payer: COMMERCIAL

## 2022-06-16 DIAGNOSIS — Z23 ENCOUNTER FOR IMMUNIZATION: Primary | ICD-10-CM

## 2022-06-16 PROCEDURE — 90471 IMMUNIZATION ADMIN: CPT

## 2022-06-16 PROCEDURE — 99207 PR NO CHARGE NURSE ONLY: CPT

## 2022-06-16 PROCEDURE — 90714 TD VACC NO PRESV 7 YRS+ IM: CPT

## 2022-07-05 ENCOUNTER — OFFICE VISIT (OUTPATIENT)
Dept: INTERNAL MEDICINE | Facility: CLINIC | Age: 62
End: 2022-07-05
Payer: COMMERCIAL

## 2022-07-05 VITALS
BODY MASS INDEX: 25.08 KG/M2 | WEIGHT: 150.5 LBS | TEMPERATURE: 97.9 F | OXYGEN SATURATION: 100 % | DIASTOLIC BLOOD PRESSURE: 80 MMHG | SYSTOLIC BLOOD PRESSURE: 138 MMHG | RESPIRATION RATE: 18 BRPM | HEART RATE: 67 BPM | HEIGHT: 65 IN

## 2022-07-05 DIAGNOSIS — R73.01 IMPAIRED FASTING GLUCOSE: ICD-10-CM

## 2022-07-05 DIAGNOSIS — I10 PRIMARY HYPERTENSION: Primary | ICD-10-CM

## 2022-07-05 DIAGNOSIS — E78.49 OTHER HYPERLIPIDEMIA: ICD-10-CM

## 2022-07-05 DIAGNOSIS — Z23 COVID-19 VACCINE ADMINISTERED: ICD-10-CM

## 2022-07-05 LAB
ALT SERPL W P-5'-P-CCNC: 19 U/L (ref 10–35)
ANION GAP SERPL CALCULATED.3IONS-SCNC: 9 MMOL/L (ref 7–15)
BUN SERPL-MCNC: 14.7 MG/DL (ref 8–23)
CALCIUM SERPL-MCNC: 10 MG/DL (ref 8.8–10.2)
CHLORIDE SERPL-SCNC: 102 MMOL/L (ref 98–107)
CHOLEST SERPL-MCNC: 229 MG/DL
CREAT SERPL-MCNC: 0.85 MG/DL (ref 0.51–0.95)
DEPRECATED HCO3 PLAS-SCNC: 28 MMOL/L (ref 22–29)
ERYTHROCYTE [DISTWIDTH] IN BLOOD BY AUTOMATED COUNT: 11.8 % (ref 10–15)
GFR SERPL CREATININE-BSD FRML MDRD: 77 ML/MIN/1.73M2
GLUCOSE SERPL-MCNC: 117 MG/DL (ref 70–99)
HBA1C MFR BLD: 5.5 % (ref 0–5.6)
HCT VFR BLD AUTO: 43 % (ref 35–47)
HDLC SERPL-MCNC: 81 MG/DL
HGB BLD-MCNC: 14.3 G/DL (ref 11.7–15.7)
LDLC SERPL CALC-MCNC: 124 MG/DL
MCH RBC QN AUTO: 32.1 PG (ref 26.5–33)
MCHC RBC AUTO-ENTMCNC: 33.3 G/DL (ref 31.5–36.5)
MCV RBC AUTO: 96 FL (ref 78–100)
NONHDLC SERPL-MCNC: 148 MG/DL
PLATELET # BLD AUTO: 233 10E3/UL (ref 150–450)
POTASSIUM SERPL-SCNC: 5 MMOL/L (ref 3.4–5.3)
RBC # BLD AUTO: 4.46 10E6/UL (ref 3.8–5.2)
SODIUM SERPL-SCNC: 139 MMOL/L (ref 136–145)
TRIGL SERPL-MCNC: 121 MG/DL
WBC # BLD AUTO: 5.7 10E3/UL (ref 4–11)

## 2022-07-05 PROCEDURE — 99214 OFFICE O/P EST MOD 30 MIN: CPT | Mod: 25 | Performed by: NURSE PRACTITIONER

## 2022-07-05 PROCEDURE — 36415 COLL VENOUS BLD VENIPUNCTURE: CPT | Performed by: NURSE PRACTITIONER

## 2022-07-05 PROCEDURE — 80061 LIPID PANEL: CPT | Performed by: NURSE PRACTITIONER

## 2022-07-05 PROCEDURE — 80048 BASIC METABOLIC PNL TOTAL CA: CPT | Performed by: NURSE PRACTITIONER

## 2022-07-05 PROCEDURE — 85027 COMPLETE CBC AUTOMATED: CPT | Performed by: NURSE PRACTITIONER

## 2022-07-05 PROCEDURE — 83036 HEMOGLOBIN GLYCOSYLATED A1C: CPT | Performed by: NURSE PRACTITIONER

## 2022-07-05 PROCEDURE — 0064A COVID-19,PF,MODERNA (18+ YRS BOOSTER .25ML): CPT | Performed by: NURSE PRACTITIONER

## 2022-07-05 PROCEDURE — 84460 ALANINE AMINO (ALT) (SGPT): CPT | Performed by: NURSE PRACTITIONER

## 2022-07-05 PROCEDURE — 91306 COVID-19,PF,MODERNA (18+ YRS BOOSTER .25ML): CPT | Performed by: NURSE PRACTITIONER

## 2022-07-05 RX ORDER — LISINOPRIL 10 MG/1
10 TABLET ORAL DAILY
Qty: 90 TABLET | Refills: 3 | Status: SHIPPED | OUTPATIENT
Start: 2022-07-05 | End: 2023-07-13

## 2022-07-05 RX ORDER — ATORVASTATIN CALCIUM 40 MG/1
40 TABLET, FILM COATED ORAL DAILY
Qty: 90 TABLET | Refills: 3 | Status: SHIPPED | OUTPATIENT
Start: 2022-07-05 | End: 2023-07-13

## 2022-07-05 NOTE — ASSESSMENT & PLAN NOTE
Blood pressure was initially elevated but improved with recheck.  She is advised to monitor this with occasional home blood pressure checks.   if systolic readings are consistently greater than 140, I recommend that we make an adjustment to lisinopril and increase it to 20 mg daily.  Follow-up blood pressure check in the office in 6 months.

## 2022-07-05 NOTE — PROGRESS NOTES
Internal Medicine Office Visit  [facility]   Patient Name: Monse Park  Patient Age: 61 year old  YOB: 1960   MRN: [id number]    Date of Visit: 7/5/2022  Patient presents with:  Follow Up: Med check, blood work         Assessment / Plan / Medical Decision Making:    Problem List Items Addressed This Visit        Endocrine    HLD (hyperlipidemia)     Repeat lipid panel today.  Continue atorvastatin for primary prevention.           Relevant Medications    atorvastatin (LIPITOR) 40 MG tablet    Other Relevant Orders    Lipid panel reflex to direct LDL Fasting (Completed)    ALT (Completed)    Impaired fasting glucose     Slightly elevated fasting glucose level with last year's blood check.  We will plan to check an A1c today.           Relevant Orders    Hemoglobin A1c (Completed)       Circulatory    HTN (hypertension) - Primary     Blood pressure was initially elevated but improved with recheck.  She is advised to monitor this with occasional home blood pressure checks.   if systolic readings are consistently greater than 140, I recommend that we make an adjustment to lisinopril and increase it to 20 mg daily.  Follow-up blood pressure check in the office in 6 months.           Relevant Medications    lisinopril (ZESTRIL) 10 MG tablet      Other Visit Diagnoses     COVID-19 vaccine administered        Relevant Orders    COVID-19,PF,MODERNA (18+ YRS BOOSTER .25ML) (Completed)         Physical with Pap smear is due at any time.  We discussed doing this in approximately 6 months so that we could pair this with a blood pressure follow-up.      I have changed [place] FULLERRikki Xavier's atorvastatin and lisinopril.          Orders Placed This Encounter   Procedures     REVIEW OF HEALTH MAINTENANCE PROTOCOL ORDERS     COVID-19,PF,MODERNA (18+ YRS BOOSTER .25ML)     Hemoglobin A1c     Basic metabolic panel     Lipid panel reflex to direct LDL Fasting     ALT   Followup: Return in  about 6 months (around 1/5/2023) for Routine preventive. earlier if needed.      Anusha Fermin, RHETT, CNP        HPI:  Monse Park is a 61 year old year old who presents to the office today for follow up of chronic medical conditions.  She is doing well and has no concerns today.    She has hypertension which is treated with lisinopril.  She does not do home blood pressure checks.    She continues atorvastatin for hyperlipidemia.  No concerns related to this medication.      Health Maintenance Review  Health Maintenance   Topic Date Due     ADVANCE CARE PLANNING  Never done     HPV TEST  03/21/2022     PAP  03/21/2022     PREVENTIVE CARE VISIT  03/31/2022     INFLUENZA VACCINE (1) 09/01/2022     MAMMO SCREENING  03/25/2023     ANNUAL REVIEW OF HM ORDERS  07/05/2023     COLORECTAL CANCER SCREENING  07/06/2026     LIPID  07/05/2027     DTAP/TDAP/TD IMMUNIZATION (3 - Td or Tdap) 06/16/2032     PHQ-2 (once per calendar year)  Completed     ZOSTER IMMUNIZATION  Completed     COVID-19 Vaccine  Completed     Pneumococcal Vaccine: Pediatrics (0 to 5 Years) and At-Risk Patients (6 to 64 Years)  Aged Out     IPV IMMUNIZATION  Aged Out     MENINGITIS IMMUNIZATION  Aged Out     HEPATITIS B IMMUNIZATION  Aged Out     HEPATITIS C SCREENING  Discontinued     HIV SCREENING  Discontinued     LUNG CANCER SCREENING  Discontinued       Current Scheduled Meds:  Outpatient Encounter Medications as of 7/5/2022   Medication Sig Dispense Refill     atorvastatin (LIPITOR) 40 MG tablet Take 1 tablet (40 mg) by mouth daily 90 tablet 3     lisinopril (ZESTRIL) 10 MG tablet Take 1 tablet (10 mg) by mouth daily 90 tablet 3     [DISCONTINUED] atorvastatin (LIPITOR) 40 MG tablet TAKE 1 TABLET BY MOUTH EVERY DAY 90 tablet 0     [DISCONTINUED] lisinopril (ZESTRIL) 10 MG tablet TAKE 1 TABLET BY MOUTH EVERY DAY 90 tablet 0     No facility-administered encounter medications on file as of 7/5/2022.       Objective / Physical Examination:  Vitals:     "07/05/22 0815 07/05/22 0843   BP: (!) 140/90 138/80   BP Location: Right arm    Patient Position: Sitting    Cuff Size: Adult Regular    Pulse: 67    Resp: 18    Temp: 97.9  F (36.6  C)    TempSrc: Oral    SpO2: 100%    Weight: 68.3 kg (150 lb 8 oz)    Height: 1.651 m (5' 5\")      Wt Readings from Last 3 Encounters:   07/05/22 68.3 kg (150 lb 8 oz)   03/31/21 70.8 kg (156 lb)   02/26/20 70.3 kg (155 lb)     Body mass index is 25.04 kg/m .     Constitutional: In no apparent distress  Respiratory: Clear to auscultation bilaterally. Normal inspiratory and expiratory effort  Cardiovascular: Regular rate and rhythm. No murmurs, rubs, or gallops. No edema. No carotid bruits.       "

## 2022-07-05 NOTE — ASSESSMENT & PLAN NOTE
Slightly elevated fasting glucose level with last year's blood check.  We will plan to check an A1c today.

## 2022-07-06 ENCOUNTER — TELEPHONE (OUTPATIENT)
Dept: INTERNAL MEDICINE | Facility: CLINIC | Age: 62
End: 2022-07-06

## 2022-07-06 NOTE — TELEPHONE ENCOUNTER
Please abstract the following data from this visit with this patient into the appropriate field in Epic:    Other Tests found in the patient's chart through Chart Review/Care Everywhere:    Influenza Vaccine done by this group [facility] on this date: 9/23/2021      Patient is showing on quality list that measure has not been met for Influenza vaccine administration however has had immunization documented and has met measure.    Note to Abstraction: If this section is blank, no results were found via Chart Review/Care Everywhere.

## 2022-09-25 ENCOUNTER — HEALTH MAINTENANCE LETTER (OUTPATIENT)
Age: 62
End: 2022-09-25

## 2022-11-08 ENCOUNTER — VIRTUAL VISIT (OUTPATIENT)
Dept: INTERNAL MEDICINE | Facility: CLINIC | Age: 62
End: 2022-11-08
Payer: COMMERCIAL

## 2022-11-08 DIAGNOSIS — B97.89 VIRAL RESPIRATORY INFECTION: ICD-10-CM

## 2022-11-08 DIAGNOSIS — J98.8 VIRAL RESPIRATORY INFECTION: ICD-10-CM

## 2022-11-08 DIAGNOSIS — J02.8 ACUTE PHARYNGITIS DUE TO OTHER SPECIFIED ORGANISMS: Primary | ICD-10-CM

## 2022-11-08 PROBLEM — K57.30 DIVERTICULAR DISEASE OF LARGE INTESTINE: Status: ACTIVE | Noted: 2021-06-30

## 2022-11-08 PROBLEM — D12.4 BENIGN NEOPLASM OF DESCENDING COLON: Status: ACTIVE | Noted: 2021-07-02

## 2022-11-08 PROCEDURE — 99213 OFFICE O/P EST LOW 20 MIN: CPT | Mod: TEL | Performed by: INTERNAL MEDICINE

## 2022-11-08 RX ORDER — BENZONATATE 200 MG/1
200 CAPSULE ORAL 3 TIMES DAILY PRN
Qty: 20 CAPSULE | Refills: 1 | Status: SHIPPED | OUTPATIENT
Start: 2022-11-08 | End: 2023-10-10

## 2022-11-08 RX ORDER — LIDOCAINE HYDROCHLORIDE 20 MG/ML
15 SOLUTION OROPHARYNGEAL
Qty: 100 ML | Refills: 3 | Status: SHIPPED | OUTPATIENT
Start: 2022-11-08 | End: 2023-03-19

## 2022-11-08 RX ORDER — GUAIFENESIN 600 MG/1
600 TABLET, EXTENDED RELEASE ORAL 2 TIMES DAILY
Qty: 60 TABLET | Refills: 2 | Status: SHIPPED | OUTPATIENT
Start: 2022-11-08 | End: 2023-03-19

## 2022-11-08 ASSESSMENT — ENCOUNTER SYMPTOMS: SORE THROAT: 1

## 2022-11-08 NOTE — PROGRESS NOTES
"France is a 62 year old who is being evaluated via a billable telephone visit.      What phone number would you like to be contacted at? [phone]  How would you like to obtain your AVS? Luis Miguelt    {PROVIDER CHARTING PREFERENCE:641308}    Subjective   Fracne is a 62 year old accompanied by her ALONE, presenting for the following health issues:  No chief complaint on file.      Pharyngitis     History of Present Illness       Reason for visit:  SORE THROAT    She eats 2-3 servings of fruits and vegetables daily.She consumes 1 sweetened beverage(s) daily.She exercises with enough effort to increase her heart rate 20 to 29 minutes per day.  She exercises with enough effort to increase her heart rate 5 days per week.   She is taking medications regularly.       {SUPERLIST (Optional):337828}  {additonal problems for provider to add (Optional):159637}    Review of Systems   HENT: Positive for sore throat.       {ROS COMP (Optional):304480}      Objective           Vitals:  No vitals were obtained today due to virtual visit.    Physical Exam   {GENERAL APPEARANCE:50::\"healthy\",\"alert\",\"no distress\"}  PSYCH: Alert and oriented times 3; coherent speech, normal   rate and volume, able to articulate logical thoughts, able   to abstract reason, no tangential thoughts, no hallucinations   or delusions  Her affect is { :3410010::\"normal\"}  RESP: No cough, no audible wheezing, able to talk in full sentences  Remainder of exam unable to be completed due to telephone visits    {Diagnostic Test Results (Optional):330226}    {AMBULATORY ATTESTATION (Optional):569566}        Phone call duration: *** minutes    "

## 2022-11-08 NOTE — PROGRESS NOTES
Virginia is a 62 year old female being evaluated via a billable phone visit, and would like to be contacted via the following  Cell number on file:    Telephone Information:   Mobile [phone]       ASSESSMENT and PLAN:  1. Acute pharyngitis due to other specified organisms  Possible strep but less likely with cough and lack of exudate.  Treat symptoms.  Provide order for strep swab.  Encouraged to check for COVID again  - Streptococcus A Rapid Screen w/Reflex to PCR - Clinic Collect  - lidocaine, viscous, (XYLOCAINE) 2 % solution; Swish and spit 15 mLs in mouth every 3 hours as needed for moderate pain ; Max 8 doses/24 hour period.  Dispense: 100 mL; Refill: 3  - benzonatate (TESSALON) 200 MG capsule; Take 1 capsule (200 mg) by mouth 3 times daily as needed for cough  Dispense: 20 capsule; Refill: 1  - guaiFENesin (MUCINEX) 600 MG 12 hr tablet; Take 1 tablet (600 mg) by mouth 2 times daily  Dispense: 60 tablet; Refill: 2    2. Viral respiratory infection  If negative strep probably COVID/omicron.  If so given good health would argue against Paxlovid    3.  Hypertension.  Stable on lisinopril     Patient Instructions   Benzonatate as needed    Viscous lidocaine as needed    Mucinex 600 mg twice daily    Recommend repeat check for COVID    If positive for COVID advise against Paxlovid    Elliot communication initiated            Return in about 6 months (around 5/8/2023) for using a video visit.       CHIEF COMPLAINT:  Chief Complaint   Patient presents with     Recheck Medication     Pharyngitis     PT REPORTS SORE THROAT OF 11/7/22       Pharyngitis     History of Present Illness       Reason for visit:  SORE THROAT    She eats 2-3 servings of fruits and vegetables daily.She consumes 1 sweetened beverage(s) daily.She exercises with enough effort to increase her heart rate 20 to 29 minutes per day.  She exercises with enough effort to increase her heart rate 5 days per week.   She is taking medications  "regularly.      HISTORY OF PRESENT ILLNESS:  Virginia is a 62 year old female contacting the clinic today via phone for complaints of sore throat.  She reports developing symptoms on November 6.  She believes she got this from her grandchildren.  She feels warm.  Self inspection of the tonsils shows no exudate.  She is coughing with rhinorrhea and congestion.  Severe sore throat worse in the morning.  No history of strep    Vaccinated against COVID x4.  No obvious exposure to COVID but again grandchildren sick    Blood pressure stable    REVIEW OF SYSTEMS:  No peripheral edema    PFSH:  Social History     Social History Narrative     Not on file     Birthday today    TOBACCO USE:  History   Smoking Status     Former     Packs/day: 0.25     Years: 30.00     Quit date: 1/8/2009   Smokeless Tobacco     Never       VITALS:  There were no vitals filed for this visit.  There were no vitals taken for this visit. Estimated body mass index is 25.04 kg/m  as calculated from the following:    Height as of 7/5/22: 1.651 m (5' 5\").    Weight as of 7/5/22: 68.3 kg (150 lb 8 oz).    PHYSICAL EXAM:  (observations via Phone)  Alert and oriented.  Occasional cough.    MEDICATIONS  Current Outpatient Medications   Medication Sig Dispense Refill     atorvastatin (LIPITOR) 40 MG tablet Take 1 tablet (40 mg) by mouth daily 90 tablet 3     benzonatate (TESSALON) 200 MG capsule Take 1 capsule (200 mg) by mouth 3 times daily as needed for cough 20 capsule 1     guaiFENesin (MUCINEX) 600 MG 12 hr tablet Take 1 tablet (600 mg) by mouth 2 times daily 60 tablet 2     lidocaine, viscous, (XYLOCAINE) 2 % solution Swish and spit 15 mLs in mouth every 3 hours as needed for moderate pain ; Max 8 doses/24 hour period. 100 mL 3     lisinopril (ZESTRIL) 10 MG tablet Take 1 tablet (10 mg) by mouth daily 90 tablet 3       Notes summarized:   Labs, x-rays, cardiology, GI tests reviewed: Normal renal function  Recent Labs   Lab Test 07/05/22  0910 " 03/31/21  1507   HGB 14.3  --    WBC 5.7  --     139   POTASSIUM 5.0 4.1   CR 0.85 0.84   A1C 5.5  --      No results found for: [id number]  Lab Results   Component Value Date    CHOL 229 07/05/2022     New orders: No orders of the defined types were placed in this encounter.      Independent review of:  Supplemental history by:      Patient would like to receive their AVS by Elliot Brown MD  [facility]    Phone Start Time: 2:46 PM  Phone End time:  3:06 PM  Conversation plus orders: 20 minutes  Dictation time:  3 minutes    The visit lasted a total of 23 minutes

## 2022-11-08 NOTE — PATIENT INSTRUCTIONS
Benzonatate as needed    Viscous lidocaine as needed    Mucinex 600 mg twice daily    Recommend repeat check for COVID    If positive for COVID advise against Paxlovid    MyChart communication initiated

## 2023-03-17 ENCOUNTER — OFFICE VISIT (OUTPATIENT)
Dept: INTERNAL MEDICINE | Facility: CLINIC | Age: 63
End: 2023-03-17
Payer: COMMERCIAL

## 2023-03-17 ENCOUNTER — NURSE TRIAGE (OUTPATIENT)
Dept: NURSING | Facility: CLINIC | Age: 63
End: 2023-03-17
Payer: COMMERCIAL

## 2023-03-17 VITALS
HEIGHT: 65 IN | DIASTOLIC BLOOD PRESSURE: 83 MMHG | HEART RATE: 104 BPM | OXYGEN SATURATION: 97 % | WEIGHT: 155.4 LBS | TEMPERATURE: 98.1 F | BODY MASS INDEX: 25.89 KG/M2 | SYSTOLIC BLOOD PRESSURE: 142 MMHG

## 2023-03-17 DIAGNOSIS — I10 PRIMARY HYPERTENSION: ICD-10-CM

## 2023-03-17 DIAGNOSIS — H81.10 BENIGN PAROXYSMAL POSITIONAL VERTIGO, UNSPECIFIED LATERALITY: Primary | ICD-10-CM

## 2023-03-17 PROCEDURE — 99214 OFFICE O/P EST MOD 30 MIN: CPT | Performed by: INTERNAL MEDICINE

## 2023-03-17 ASSESSMENT — PAIN SCALES - GENERAL: PAINLEVEL: NO PAIN (0)

## 2023-03-17 NOTE — TELEPHONE ENCOUNTER
This morning around 5 a.m. woke and the room was spinning. [place] nausea and got the chills. Laid in bed, not moving head felt fine. Head movement caused dizziness. She will have someone bring her to the urgent care now.  Rosi Joy RN  [facility]      Reason for Disposition    Spinning or tilting sensation (vertigo) present now and one or more stroke risk factors (i.e., hypertension, diabetes mellitus, prior stroke/TIA, heart attack, age over 60) (Exception: prior physician evaluation for this AND no different/worse than usual)    Additional Information    Negative: SEVERE difficulty breathing (e.g., struggling for each breath, speaks in single words)    Negative: Shock suspected (e.g., cold/pale/clammy skin, too weak to stand, low BP, rapid pulse)    Negative: Difficult to awaken or acting confused (e.g., disoriented, slurred speech)    Negative: Fainted, and still feels dizzy afterwards    Negative: Overdose (accidental or intentional) of medications    Negative: New neurologic deficit that is present now: * Weakness of the face, arm, or leg on one side of the body * Numbness of the face, arm, or leg on one side of the body * Loss of speech or garbled speech    Negative: Heart beating < 50 beats per minute OR > 140 beats per minute    Negative: Sounds like a life-threatening emergency to the triager    Negative: Chest pain    Negative: Rectal bleeding, bloody stool, or tarry-black stool    Negative: Vomiting is main symptom    Negative: Diarrhea is main symptom    Negative: Headache is main symptom    Negative: Heat exhaustion suspected (i.e., dehydration from heat exposure)    Negative: Patient states that they are having an anxiety or panic attack    Negative: Dizziness from low blood sugar (i.e., < 60 mg/dl or 3.5 mmol/l)    Negative: SEVERE dizziness (e.g., unable to stand, requires support to walk, feels like passing out now)    Negative: SEVERE headache or neck pain    Protocols used:  DIZZINESS-A-OH

## 2023-03-19 NOTE — PATIENT INSTRUCTIONS
Please follow up if you have any further issues.    You may contact me by phone or MyChart if you are worsening or if things are not improving.    ______________________________________________________________________     You can call [phone] during weekday hours to get a hold of our team coordinators if you need to get a message to me.    There are 3 people in our building taking phone calls for me.  Be sure to listen to the prompts to get a hold of them.    You first press 1 for English (press another number for a different language) and then press 2 to get the .    They can then take your message and forward it onto me.    ______________________________________________________________________     Please remember that you can call [phone] ANYTIME to schedule an appointment.     You can schedule appointments 24 hours a day, 7 days a week.      Sometimes the best time to schedule an appointment is after clinic hours when less people are calling in.      Weekends are another option for calling in to schedule appointments.     ______________________________________________________________________   Preventative Measures:  Health Maintenance   Topic Date Due    ADVANCE CARE PLANNING  Never done    HPV TEST  03/21/2022    PAP  03/21/2022    YEARLY PREVENTIVE VISIT  03/31/2022    COVID-19 Vaccine (5 - Booster for Pfizer series) 08/30/2022    MAMMO SCREENING  03/25/2023    ANNUAL REVIEW OF HM ORDERS  07/05/2023    COLORECTAL CANCER SCREENING  07/06/2026    LIPID  07/05/2027    DTAP/TDAP/TD IMMUNIZATION (3 - Td or Tdap) 06/16/2032    PHQ-2 (once per calendar year)  Completed    INFLUENZA VACCINE  Completed    ZOSTER IMMUNIZATION  Completed    Pneumococcal Vaccine: Pediatrics (0 to 5 Years) and At-Risk Patients (6 to 64 Years)  Aged Out    IPV IMMUNIZATION  Aged Out    MENINGITIS IMMUNIZATION  Aged Out    HEPATITIS C SCREENING  Discontinued    HIV SCREENING  Discontinued    LUNG CANCER SCREENING   Discontinued

## 2023-06-04 ENCOUNTER — HEALTH MAINTENANCE LETTER (OUTPATIENT)
Age: 63
End: 2023-06-04

## 2023-08-03 ENCOUNTER — TELEPHONE (OUTPATIENT)
Dept: INTERNAL MEDICINE | Facility: CLINIC | Age: 63
End: 2023-08-03
Payer: COMMERCIAL

## 2023-08-03 NOTE — TELEPHONE ENCOUNTER
MATTCNIKOLAI if patient calls back see if she would like to change her 08/08 appt to a physical since she is due for one.

## 2023-10-10 ENCOUNTER — OFFICE VISIT (OUTPATIENT)
Dept: INTERNAL MEDICINE | Facility: CLINIC | Age: 63
End: 2023-10-10
Payer: COMMERCIAL

## 2023-10-10 VITALS
HEART RATE: 63 BPM | OXYGEN SATURATION: 98 % | TEMPERATURE: 97.7 F | DIASTOLIC BLOOD PRESSURE: 80 MMHG | RESPIRATION RATE: 20 BRPM | BODY MASS INDEX: 26.29 KG/M2 | SYSTOLIC BLOOD PRESSURE: 128 MMHG | WEIGHT: 154 LBS | HEIGHT: 64 IN

## 2023-10-10 DIAGNOSIS — I10 PRIMARY HYPERTENSION: ICD-10-CM

## 2023-10-10 DIAGNOSIS — E78.49 OTHER HYPERLIPIDEMIA: ICD-10-CM

## 2023-10-10 DIAGNOSIS — R73.01 IMPAIRED FASTING GLUCOSE: ICD-10-CM

## 2023-10-10 DIAGNOSIS — Z12.31 VISIT FOR SCREENING MAMMOGRAM: ICD-10-CM

## 2023-10-10 DIAGNOSIS — Z12.4 CERVICAL CANCER SCREENING: ICD-10-CM

## 2023-10-10 DIAGNOSIS — Z00.00 ROUTINE GENERAL MEDICAL EXAMINATION AT A HEALTH CARE FACILITY: Primary | ICD-10-CM

## 2023-10-10 DIAGNOSIS — N62 MACROMASTIA: ICD-10-CM

## 2023-10-10 LAB
ALT SERPL W P-5'-P-CCNC: 26 U/L (ref 0–50)
ANION GAP SERPL CALCULATED.3IONS-SCNC: 14 MMOL/L (ref 7–15)
BUN SERPL-MCNC: 12.4 MG/DL (ref 8–23)
CALCIUM SERPL-MCNC: 9.7 MG/DL (ref 8.8–10.2)
CHLORIDE SERPL-SCNC: 101 MMOL/L (ref 98–107)
CHOLEST SERPL-MCNC: 215 MG/DL
CREAT SERPL-MCNC: 0.79 MG/DL (ref 0.51–0.95)
DEPRECATED HCO3 PLAS-SCNC: 25 MMOL/L (ref 22–29)
EGFRCR SERPLBLD CKD-EPI 2021: 83 ML/MIN/1.73M2
ERYTHROCYTE [DISTWIDTH] IN BLOOD BY AUTOMATED COUNT: 11.6 % (ref 10–15)
GLUCOSE SERPL-MCNC: 120 MG/DL (ref 70–99)
HBA1C MFR BLD: 5.3 % (ref 0–5.6)
HCT VFR BLD AUTO: 43.4 % (ref 35–47)
HDLC SERPL-MCNC: 72 MG/DL
HGB BLD-MCNC: 14.9 G/DL (ref 11.7–15.7)
LDLC SERPL CALC-MCNC: 116 MG/DL
MCH RBC QN AUTO: 32 PG (ref 26.5–33)
MCHC RBC AUTO-ENTMCNC: 34.3 G/DL (ref 31.5–36.5)
MCV RBC AUTO: 93 FL (ref 78–100)
NONHDLC SERPL-MCNC: 143 MG/DL
PLATELET # BLD AUTO: 234 10E3/UL (ref 150–450)
POTASSIUM SERPL-SCNC: 4.1 MMOL/L (ref 3.4–5.3)
RBC # BLD AUTO: 4.66 10E6/UL (ref 3.8–5.2)
SODIUM SERPL-SCNC: 140 MMOL/L (ref 135–145)
TRIGL SERPL-MCNC: 137 MG/DL
WBC # BLD AUTO: 5.9 10E3/UL (ref 4–11)

## 2023-10-10 PROCEDURE — 84460 ALANINE AMINO (ALT) (SGPT): CPT | Performed by: NURSE PRACTITIONER

## 2023-10-10 PROCEDURE — 91320 SARSCV2 VAC 30MCG TRS-SUC IM: CPT | Performed by: NURSE PRACTITIONER

## 2023-10-10 PROCEDURE — 83036 HEMOGLOBIN GLYCOSYLATED A1C: CPT | Performed by: NURSE PRACTITIONER

## 2023-10-10 PROCEDURE — 90471 IMMUNIZATION ADMIN: CPT | Performed by: NURSE PRACTITIONER

## 2023-10-10 PROCEDURE — 90480 ADMN SARSCOV2 VAC 1/ONLY CMP: CPT | Performed by: NURSE PRACTITIONER

## 2023-10-10 PROCEDURE — 99396 PREV VISIT EST AGE 40-64: CPT | Mod: 25 | Performed by: NURSE PRACTITIONER

## 2023-10-10 PROCEDURE — G0145 SCR C/V CYTO,THINLAYER,RESCR: HCPCS | Performed by: NURSE PRACTITIONER

## 2023-10-10 PROCEDURE — 99213 OFFICE O/P EST LOW 20 MIN: CPT | Mod: 25 | Performed by: NURSE PRACTITIONER

## 2023-10-10 PROCEDURE — 85027 COMPLETE CBC AUTOMATED: CPT | Performed by: NURSE PRACTITIONER

## 2023-10-10 PROCEDURE — 80061 LIPID PANEL: CPT | Performed by: NURSE PRACTITIONER

## 2023-10-10 PROCEDURE — 87624 HPV HI-RISK TYP POOLED RSLT: CPT | Performed by: NURSE PRACTITIONER

## 2023-10-10 PROCEDURE — 36415 COLL VENOUS BLD VENIPUNCTURE: CPT | Performed by: NURSE PRACTITIONER

## 2023-10-10 PROCEDURE — 80048 BASIC METABOLIC PNL TOTAL CA: CPT | Performed by: NURSE PRACTITIONER

## 2023-10-10 PROCEDURE — 90682 RIV4 VACC RECOMBINANT DNA IM: CPT | Performed by: NURSE PRACTITIONER

## 2023-10-10 RX ORDER — ATORVASTATIN CALCIUM 40 MG/1
40 TABLET, FILM COATED ORAL DAILY
Qty: 90 TABLET | Refills: 0 | Status: SHIPPED | OUTPATIENT
Start: 2023-10-10 | End: 2023-10-10

## 2023-10-10 RX ORDER — ATORVASTATIN CALCIUM 40 MG/1
40 TABLET, FILM COATED ORAL DAILY
Qty: 90 TABLET | Refills: 3 | Status: SHIPPED | OUTPATIENT
Start: 2023-10-10 | End: 2024-02-13

## 2023-10-10 RX ORDER — LISINOPRIL 10 MG/1
10 TABLET ORAL DAILY
Qty: 90 TABLET | Refills: 3 | Status: SHIPPED | OUTPATIENT
Start: 2023-10-10 | End: 2024-02-12

## 2023-10-10 RX ORDER — LISINOPRIL 10 MG/1
10 TABLET ORAL DAILY
Qty: 90 TABLET | Refills: 0 | Status: SHIPPED | OUTPATIENT
Start: 2023-10-10 | End: 2023-10-10

## 2023-10-10 NOTE — ASSESSMENT & PLAN NOTE
Hypertensive. She would like to try a Super Beets complex for another 1-2 weeks and do BP checks at home. [facility] If above 130/80 consistently we will increase lisinopril to 20 mg daily

## 2023-10-10 NOTE — PROGRESS NOTES
"   SUBJECTIVE:   CC: [place] is an 62 year old who presents for preventive health visit.       10/10/2023     1:09 PM   Additional Questions   Roomed by Shikha CHESTER CMA   Accompanied by N/A       History of Present Illness       Reason for visit:  Annual review for medications    She eats 2-3 servings of fruits and vegetables daily.She consumes 0 sweetened beverage(s) daily.She exercises with enough effort to increase her heart rate 30 to 60 minutes per day.    She is taking medications regularly.    Physical Health:  In general, how would you rate your overall physical health? excellent  Outside of work, how many days during the week do you exercise?4-5 days/week  Outside of work, approximately how many minutes a day do you exercise?45-60 minutes  If you drink alcohol do you typically have >3 drinks per day or >7 drinks per week? No  Do you usually eat at least 4 servings of fruit and vegetables a day, include whole grains & fiber and avoid regularly eating high fat or \"junk\" foods? No  Do you have any problems taking medications regularly? No  Do you have any side effects from medications? none    Macromastia was reviewed. She has chronic back pain, shoulder pain, deep indentations in the shoulders from bra straps. She asks about surgical reduction.     She is taking a super beets supplement. No recent blood pressure checks at home.     The following health maintenance items are reviewed in Epic and correct as of today:  Health Maintenance   Topic Date Due    ADVANCE CARE PLANNING  Never done    HPV TEST  03/21/2022    PAP  03/21/2022    MAMMO SCREENING  03/25/2023    INFLUENZA VACCINE (1) 09/01/2023    COVID-19 Vaccine (5 - 2023-24 season) 09/01/2023    YEARLY PREVENTIVE VISIT  10/10/2024    ANNUAL REVIEW OF HM ORDERS  10/10/2024    COLORECTAL CANCER SCREENING  06/30/2026    LIPID  07/05/2027    DTAP/TDAP/TD IMMUNIZATION (3 - Td or Tdap) 06/16/2032    PHQ-2 (once per calendar year)  Completed    ZOSTER IMMUNIZATION  " "Completed    Pneumococcal Vaccine: Pediatrics (0 to 5 Years) and At-Risk Patients (6 to 64 Years)  Aged Out    IPV IMMUNIZATION  Aged Out    HPV IMMUNIZATION  Aged Out    MENINGITIS IMMUNIZATION  Aged Out    HEPATITIS C SCREENING  Discontinued    HIV SCREENING  Discontinued    LUNG CANCER SCREENING  Discontinued       Appropriate preventive services were discussed with this patient, including applicable screening as appropriate for fall prevention, nutrition, physical activity, Tobacco-use cessation, weight loss and cognition.  Checklist reviewing preventive services available has been given to the patient.    Social History     Tobacco Use    Smoking status: Former     Packs/day: 0.25     Years: 30.00     Pack years: 7.50     Types: Cigarettes     Quit date: 2009     Years since quittin.7    Smokeless tobacco: Never   Substance Use Topics    Alcohol use: Yes     Alcohol/week: 7.0 standard drinks of alcohol     Comment: Alcoholic Drinks/day: socially on weekend            No data to display              Reviewed orders with patient.  Reviewed health maintenance and updated orders accordingly - Yes      FHS-7:        No data to display                Pertinent mammograms are reviewed under the imaging tab.      3/21/2017     3:29 PM   PAP / HPV   PAP Negative for squamous intraepithelial lesion or malignancy  Electronically signed by Charity Garg CT (ASCP) on 3/27/2017 at  3:26 PM        Reviewed and updated as needed this visit by clinical staff   Tobacco  Allergies  Meds  Problems  Med Hx  Surg Hx  Fam Hx          Reviewed and updated as needed this visit by Provider   Tobacco  Allergies  Meds  Problems  Med Hx  Surg Hx  Fam Hx              OBJECTIVE:   BP (!) 140/88 (BP Location: Right arm, Patient Position: Sitting, Cuff Size: Adult Regular)   Pulse 63   Temp 97.7  F (36.5  C) (Oral)   Resp 20   Ht 1.626 m (5' 4\")   Wt 69.9 kg (154 lb)   LMP  (LMP Unknown)   SpO2 98%   BMI " 26.43 kg/m      Wt Readings from Last 5 Encounters:   10/10/23 69.9 kg (154 lb)   03/17/23 70.5 kg (155 lb 6.4 oz)   07/05/22 68.3 kg (150 lb 8 oz)   03/31/21 70.8 kg (156 lb)   02/26/20 70.3 kg (155 lb)       Physical Exam  GENERAL: healthy, alert and no distress  EYES: Eyes grossly normal to inspection, conjunctivae and sclerae normal  HENT: ear canals and TM's normal, mouth without ulcers or lesions  RESP: lungs clear to auscultation - no rales, rhonchi or wheezes  CV: regular rate and rhythm, normal S1 S2, no S3 or S4, no murmur  Genital: EXTERNAL GENITALIA: Normal appearing vulva without masses, tenderness or lesions. PERINEUM: normal and intact. URETHRAL MEATUS: normal VAGINA:  vagina with normal color and without discharge or lesions. CERVIX: normal appearing cervix without discharge or lesions. Non-friable. No CMT  PSYCH: mentation appears normal, affect normal/bright        ASSESSMENT/PLAN:     Problem List Items Addressed This Visit          Endocrine    HLD (hyperlipidemia)     Repeat lipid panel today.  Continue atorvastatin for primary prevention.         Relevant Medications    atorvastatin (LIPITOR) 40 MG tablet    Other Relevant Orders    Lipid panel reflex to direct LDL Fasting    Basic metabolic panel    CBC with platelets    ALT    Impaired fasting glucose     Slightly elevated fasting glucose level with last year's blood check.  We will plan to check an A1c today.         Relevant Orders    Hemoglobin A1c       Circulatory    HTN (hypertension)     Hypertensive. She would like to try a Super Beets complex for another 1-2 weeks and do BP checks at home. [facility] If above 130/80 consistently we will increase lisinopril to 20 mg daily          Relevant Medications    lisinopril (ZESTRIL) 10 MG tablet       Other    Macromastia     Causing back pain, deep shoulder indentations, difficulty finding a comfortable bra. Referral to plastic surgery for consideration of breast  "reduction.          Relevant Orders    Adult Plastic Surgery  Referral     Other Visit Diagnoses       Routine general medical examination at a health care facility    -  Primary    Visit for screening mammogram        Relevant Orders    MA SCREENING DIGITAL BILAT - Future  (s+30)    Cervical cancer screening        Relevant Orders    Pap screen with HPV - recommended age 30 - 65 years          - flu and covid vaccines today       COUNSELING:  Reviewed preventive health counseling, as reflected in patient instructions      BMI:   Estimated body mass index is 26.43 kg/m  as calculated from the following:    Height as of this encounter: 1.626 m (5' 4\").    Weight as of this encounter: 69.9 kg (154 lb).       She reports that she quit smoking about 14 years ago. She has a 7.50 pack-year smoking history. She has never used smokeless tobacco.      Anusha Fermin NP  [facility]  "

## 2023-10-10 NOTE — PATIENT INSTRUCTIONS
Check blood pressure at home in the next 1-2 weeks. If readings are above 130/80 for the majority of readings, we should increase the lisinopril to 20 mg daily     Preventive Health Recommendations  Female Ages 50 - 64    Yearly exam: See your health care provider every year in order to  Review health changes.   Discuss preventive care.    Review your medicines if your doctor has prescribed any.    Get a Pap test every three years (unless you have an abnormal result and your provider advises testing more often).  If you get Pap tests with HPV test, you only need to test every 5 years, unless you have an abnormal result.   You do not need a Pap test if your uterus was removed (hysterectomy) and you have not had cancer.  You should be tested each year for STDs (sexually transmitted diseases) if you're at risk.   Have a mammogram every 1 to 2 years.  Have a colonoscopy at age 45, or have a yearly FIT test (stool test). These exams screen for colon cancer.    Have a cholesterol test every 5 years, or more often if advised.  Have a diabetes test (fasting glucose) every three years. If you are at risk for diabetes, you should have this test more often.   If you are at risk for osteoporosis (brittle bone disease), think about having a bone density scan (DEXA).    Shots: Get a flu shot each year. Get a tetanus shot every 10 years.    Nutrition:   Eat at least 5 servings of fruits and vegetables each day.  Eat whole-grain bread, whole-wheat pasta and brown rice instead of white grains and rice.  Get adequate Calcium and Vitamin D.     Lifestyle  Exercise at least 150 minutes a week (30 minutes a day, 5 days a week). This will help you control your weight and prevent disease.  Limit alcohol to one drink per day.  No smoking.   Wear sunscreen to prevent skin cancer.   See your dentist every six months for an exam and cleaning.  See your eye doctor every 1 to 2 years.

## 2023-10-10 NOTE — PROGRESS NOTES
"  {PROVIDER CHARTING PREFERENCE:657700}    Jennifer Hough is a 62 year old, presenting for the following health issues:  Follow Up (Patient states she is fasting)        10/10/2023     1:09 PM   Additional Questions   Roomed by Shikha CHESTER CMA   Accompanied by N/A       History of Present Illness       Reason for visit:  Annual review for medications    She eats 2-3 servings of fruits and vegetables daily.She consumes 0 sweetened beverage(s) daily.She exercises with enough effort to increase her heart rate 30 to 60 minutes per day.    She is taking medications regularly.       {MA/LPN/RN Pre-Provider Visit Orders- hCG/UA/Strep (Optional):239861}  Hyperlipidemia Follow-Up    Are you regularly taking any medication or supplement to lower your cholesterol?   Yes- atorvastatin  Are you having muscle aches or other side effects that you think could be caused by your cholesterol lowering medication?  No    Hypertension Follow-up    Do you check your blood pressure regularly outside of the clinic? No   Are you following a low salt diet? No  Are your blood pressures ever more than 140 on the top number (systolic) OR more   than 90 on the bottom number (diastolic), for example 140/90? Yes  {additonal problems for provider to add (Optional):431221}      Review of Systems   {ROS COMP (Optional):735890}      Objective    BP (!) 140/88 (BP Location: Right arm, Patient Position: Sitting, Cuff Size: Adult Regular)   Pulse 63   Temp 97.7  F (36.5  C) (Oral)   Resp 20   Ht 1.626 m (5' 4\")   Wt 69.9 kg (154 lb)   LMP  (LMP Unknown)   SpO2 98%   BMI 26.43 kg/m    Body mass index is 26.43 kg/m .  Physical Exam   {Exam List (Optional):991117}    {Diagnostic Test Results (Optional):982218}    {AMBULATORY ATTESTATION (Optional):226431}              "

## 2023-10-10 NOTE — ASSESSMENT & PLAN NOTE
Causing back pain, deep shoulder indentations, difficulty finding a comfortable bra. Referral to plastic surgery for consideration of breast reduction.

## 2023-10-13 LAB
BKR LAB AP GYN ADEQUACY: NORMAL
BKR LAB AP GYN INTERPRETATION: NORMAL
BKR LAB AP HPV REFLEX: NORMAL
BKR LAB AP PREVIOUS ABNORMAL: NORMAL
PATH REPORT.COMMENTS IMP SPEC: NORMAL
PATH REPORT.COMMENTS IMP SPEC: NORMAL
PATH REPORT.RELEVANT HX SPEC: NORMAL

## 2023-10-17 LAB
HUMAN PAPILLOMA VIRUS 16 DNA: NEGATIVE
HUMAN PAPILLOMA VIRUS 18 DNA: NEGATIVE
HUMAN PAPILLOMA VIRUS FINAL DIAGNOSIS: NORMAL
HUMAN PAPILLOMA VIRUS OTHER HR: NEGATIVE

## 2023-10-18 PROBLEM — Z12.4 CERVICAL CANCER SCREENING: Status: ACTIVE | Noted: 2023-10-18

## 2023-10-20 ENCOUNTER — ANCILLARY PROCEDURE (OUTPATIENT)
Dept: MAMMOGRAPHY | Facility: HOSPITAL | Age: 63
End: 2023-10-20
Attending: NURSE PRACTITIONER
Payer: COMMERCIAL

## 2023-10-20 DIAGNOSIS — Z12.31 VISIT FOR SCREENING MAMMOGRAM: ICD-10-CM

## 2023-10-20 PROCEDURE — 77067 SCR MAMMO BI INCL CAD: CPT

## 2023-10-25 ENCOUNTER — PATIENT OUTREACH (OUTPATIENT)
Dept: GASTROENTEROLOGY | Facility: CLINIC | Age: 63
End: 2023-10-25
Payer: COMMERCIAL

## 2023-10-25 NOTE — TELEPHONE ENCOUNTER
FUTURE VISIT INFORMATION      FUTURE VISIT INFORMATION:  Date: 12/5/23  Time: 10:00am  [place] [facility] [place]FORMATION:  Referring provider:  Anusha Fermin NP  Referring providers clinic:  [facility]   Reason for visit/diagnosis  Macromastia - breast reduction     RECORDS REQUESTED FROM:       Clinic name Comments Records Status Imaging Status   [facility]    Imaging MA done 10/20/23  MA done 3/25/21 [facility] pac

## 2023-12-05 ENCOUNTER — OFFICE VISIT (OUTPATIENT)
Dept: PLASTIC SURGERY | Facility: CLINIC | Age: 63
End: 2023-12-05
Attending: NURSE PRACTITIONER
Payer: COMMERCIAL

## 2023-12-05 ENCOUNTER — PRE VISIT (OUTPATIENT)
Dept: PLASTIC SURGERY | Facility: CLINIC | Age: 63
End: 2023-12-05
Payer: COMMERCIAL

## 2023-12-05 VITALS
HEIGHT: 64 IN | HEART RATE: 104 BPM | RESPIRATION RATE: 16 BRPM | DIASTOLIC BLOOD PRESSURE: 89 MMHG | WEIGHT: 157.5 LBS | OXYGEN SATURATION: 99 % | BODY MASS INDEX: 26.89 KG/M2 | TEMPERATURE: 98 F | SYSTOLIC BLOOD PRESSURE: 152 MMHG

## 2023-12-05 DIAGNOSIS — N62 MACROMASTIA: ICD-10-CM

## 2023-12-05 PROCEDURE — 99204 OFFICE O/P NEW MOD 45 MIN: CPT | Performed by: PLASTIC SURGERY

## 2023-12-05 PROCEDURE — 99213 OFFICE O/P EST LOW 20 MIN: CPT | Performed by: PLASTIC SURGERY

## 2023-12-05 RX ORDER — CEFAZOLIN SODIUM 2 G/50ML
2 SOLUTION INTRAVENOUS SEE ADMIN INSTRUCTIONS
Status: CANCELLED | OUTPATIENT
Start: 2023-12-05

## 2023-12-05 RX ORDER — CEFAZOLIN SODIUM 2 G/50ML
2 SOLUTION INTRAVENOUS
Status: CANCELLED | OUTPATIENT
Start: 2023-12-05

## 2023-12-05 ASSESSMENT — PAIN SCALES - GENERAL: PAINLEVEL: NO PAIN (0)

## 2023-12-05 NOTE — PROGRESS NOTES
Referring Provider:  Anusha Fermin NP  [address] [facility] [address]  [place] [address]     Primary Care Provider:  Anusha Fermin      RE: Monse Park.  : 1960.  JORGE: 2023.    Reason for visit: Breast reduction.    HPI: The patient would like to discuss her breast reduction.  She has been large breasted all of her adult life.  She has a lot of upper back, neck, shoulder pain, shoulder grooving from bra straps, inframammary fold rashes during summers, and generalized discomfort due to the weight on her chest.  She has used all sorts of supportive garments as well as over-the-counter and prescribed medications, without continued relief for multiple years.  She wears a triple D bra would like to be around a C cup.  She has [employer] for insurance.  Her last mammogram was in 2023 BI-RADS 1.    Medical history:  Past Medical History:   Diagnosis Date    Fibrocystic breast        Surgical history:  Past Surgical History:   Procedure Laterality Date    OTHER SURGICAL HISTORY      no surgical history       Family history:  Family History   Problem Relation Age of Onset    Heart Disease Father     Hypertension Mother     Pacemaker Mother     Hypertension Sister     Hypertension Brother     Hypertension Sister     Hypertension Sister     Hypertension Sister     Hypertension Brother        Medications:  Current Outpatient Medications   Medication Sig Dispense Refill    atorvastatin (LIPITOR) 40 MG tablet Take 1 tablet (40 mg) by mouth daily 90 tablet 3    lisinopril (ZESTRIL) 10 MG tablet Take 1 tablet (10 mg) by mouth daily 90 tablet 3       Allergies:  No Known Allergies    Social history:   Social History     Tobacco Use    Smoking status: Former     Packs/day: 0.25     Years: 30.00     Additional pack years: 0.00     Total pack years: 7.50     Types: Cigarettes     Quit date: 2009     Years since quittin.9    Smokeless tobacco: Never   Substance Use Topics    Alcohol use: Yes      "Alcohol/week: 7.0 standard drinks of alcohol     Comment: Alcoholic Drinks/day: socially on weekend         Physical Examination:  BP (!) 152/89 (BP Location: Right arm, Patient Position: Sitting, Cuff Size: Adult Regular)   Pulse 104   Temp 98  F (36.7  C)   Resp 16   Ht 1.632 m (5' 4.25\")   Wt 71.4 kg (157 lb 8 oz)   LMP  (LMP Unknown)   SpO2 99%   BMI 26.82 kg/m    Body mass index is 26.82 kg/m .  BSA: 1.75 meters squared  Schnur Scale: 404 g    General: No acute distress.    Breasts: Grade 2 ptosis.  Right side may be slightly larger than left side.  Sternal notch to nipple distance less than 40 cm.        ASSESMENT and PLAN:    Based upon the above findings, a diagnosis of symptomatic bilateral breast hypertrophy was made. I had a xiomara, detailed discussion with the patient in the presence of my nurse (who was present from beginning to end) about breast reduction and the proposed surgery. I was very clear and detailed about overview of surgery, perioperative plans, and expectations. I went over the facts that this surgery would lead to scars that are permanent (showed her where they would be on pictures and her body), potential loss of nipple and breast sensation, potential inability to breastfeed, that asymmetries are guaranteed, the inability to promise cup sizes or exact sizes post-operatively, and potential rehypertrophy in the future. All risks, benefits and alternatives of the surgery including but not limited to pain, infection, bleeding, scarring, asymmetry, seromas, hematomas, wound breakdown, wound dehiscence, prominent scar, hypertrophic scar, keloid scar, requirement of further surgeries, skin necrosis, fat necrosis, nipple necrosis, T-junction site necrosis, requirement of free nipple grafts, DVT, PE, MI, CVA, pneumonia, renal failure and death, were explained in detail. She agreed she understood them all and had all her questions answered to her satisfaction. The realities of insurance " companies and requirement of prior authorization were also explained. Based on her Schnur scale, 404 g g needs to be removed from each breast. We felt that we could potentially achieve her stated cup/reduction goal with either this amount or greater. Our plan now is to proceed with a prior authorization, give her quotes for the surgery and proceed with the surgery.       All her questions were answered. She was happy with the visit. I look forward to helping her out in the near future as indicated.       Total time spent in the encounter today including chart review, visit itself, and post-visit paperwork was 45 minutes.       Harry Geronimo MD    Chief, Division of Plastic Surgery  Department of Surgery  [facility]      CC: Anusha Fermin NP  [address] [address]  CC: Anusha Fermin

## 2023-12-05 NOTE — LETTER
2023         RE: Monse FULLER Xavier  [address] [facility] [address]        Dear Colleague,    Thank you for referring your patient, Monse Park, to the [facility]. Please see a copy of my visit note below.    Referring Provider:  Anusha Fermin NP  [address] [facility] [facility] [address]  [place] [address]     Primary Care Provider:  Anusha Fermin      RE: Monse Park.  : 1960.  JORGE: 2023.    Reason for visit: Breast reduction.    HPI: The patient would like to discuss her breast reduction.  She has been large breasted all of her adult life.  She has a lot of upper back, neck, shoulder pain, shoulder grooving from bra straps, inframammary fold rashes during summers, and generalized discomfort due to the weight on her chest.  She has used all sorts of supportive garments as well as over-the-counter and prescribed medications, without continued relief for multiple years.  She wears a triple D bra would like to be around a C cup.  She has [employer] for insurance.  Her last mammogram was in 2023 BI-RADS 1.    Medical history:  Past Medical History:   Diagnosis Date    Fibrocystic breast        Surgical history:  Past Surgical History:   Procedure Laterality Date    OTHER SURGICAL HISTORY      no surgical history       Family history:  Family History   Problem Relation Age of Onset    Heart Disease Father     Hypertension Mother     Pacemaker Mother     Hypertension Sister     Hypertension Brother     Hypertension Sister     Hypertension Sister     Hypertension Sister     Hypertension Brother        Medications:  Current Outpatient Medications   Medication Sig Dispense Refill    atorvastatin (LIPITOR) 40 MG tablet Take 1 tablet (40 mg) by mouth daily 90 tablet 3    lisinopril (ZESTRIL) 10 MG tablet Take 1 tablet (10 mg) by mouth daily 90 tablet 3       Allergies:  No Known Allergies    Social history:   Social History     Tobacco Use  "   Smoking status: Former     Packs/day: 0.25     Years: 30.00     Additional pack years: 0.00     Total pack years: 7.50     Types: Cigarettes     Quit date: 2009     Years since quittin.9    Smokeless tobacco: Never   Substance Use Topics    Alcohol use: Yes     Alcohol/week: 7.0 standard drinks of alcohol     Comment: Alcoholic Drinks/day: socially on weekend         Physical Examination:  BP (!) 152/89 (BP Location: Right arm, Patient Position: Sitting, Cuff Size: Adult Regular)   Pulse 104   Temp 98  F (36.7  C)   Resp 16   Ht 1.632 m (5' 4.25\")   Wt 71.4 kg (157 lb 8 oz)   LMP  (LMP Unknown)   SpO2 99%   BMI 26.82 kg/m    Body mass index is 26.82 kg/m .  BSA: 1.75 meters squared  Schnur Scale: 404 g    General: No acute distress.    Breasts: Grade 2 ptosis.  Right side may be slightly larger than left side.  Sternal notch to nipple distance less than 40 cm.        ASSESMENT and PLAN:    Based upon the above findings, a diagnosis of symptomatic bilateral breast hypertrophy was made. I had a xiomara, detailed discussion with the patient in the presence of my nurse (who was present from beginning to end) about breast reduction and the proposed surgery. I was very clear and detailed about overview of surgery, perioperative plans, and expectations. I went over the facts that this surgery would lead to scars that are permanent (showed her where they would be on pictures and her body), potential loss of nipple and breast sensation, potential inability to breastfeed, that asymmetries are guaranteed, the inability to promise cup sizes or exact sizes post-operatively, and potential rehypertrophy in the future. All risks, benefits and alternatives of the surgery including but not limited to pain, infection, bleeding, scarring, asymmetry, seromas, hematomas, wound breakdown, wound dehiscence, prominent scar, hypertrophic scar, keloid scar, requirement of further surgeries, skin necrosis, fat necrosis, " nipple necrosis, T-junction site necrosis, requirement of free nipple grafts, DVT, PE, MI, CVA, pneumonia, renal failure and death, were explained in detail. She agreed she understood them all and had all her questions answered to her satisfaction. The realities of insurance companies and requirement of prior authorization were also explained. Based on her Schnur scale, 404 g g needs to be removed from each breast. We felt that we could potentially achieve her stated cup/reduction goal with either this amount or greater. Our plan now is to proceed with a prior authorization, give her quotes for the surgery and proceed with the surgery.       All her questions were answered. She was happy with the visit. I look forward to helping her out in the near future as indicated.       Total time spent in the encounter today including chart review, visit itself, and post-visit paperwork was 45 minutes.       Harry Geronimo MD    Chief, Division of Plastic Surgery  Department of Surgery  [facility]      CC: Anusha Fermin NP  [address] [address]

## 2023-12-05 NOTE — NURSING NOTE
"Oncology Rooming Note    December 5, 2023 10:25 AM   Monse Park is a 63 year old female who presents for:    Chief Complaint   Patient presents with    Oncology Clinic Visit     Macromastia      Initial Vitals: LMP  (LMP Unknown)  Estimated body mass index is 26.43 kg/m  as calculated from the following:    Height as of 10/10/23: 1.626 m (5' 4\").    Weight as of 10/10/23: 69.9 kg (154 lb). There is no height or weight on file to calculate BSA.  Data Unavailable Comment: Data Unavailable   No LMP recorded (lmp unknown). Patient is postmenopausal.  Allergies reviewed: Yes  Medications reviewed: Yes    Medications: Medication refills not needed today.  Pharmacy name entered into [facility] app: [id number] [place], [place] - [address]     Clinical concerns:  none      Paula Calero              "

## 2023-12-14 ENCOUNTER — TELEPHONE (OUTPATIENT)
Dept: PLASTIC SURGERY | Facility: CLINIC | Age: 63
End: 2023-12-14
Payer: COMMERCIAL

## 2023-12-14 NOTE — TELEPHONE ENCOUNTER
Received voicemail from patient on 12/14.    Left voicemail for patient. Provided direct number to discuss.  __    Arabella Adams, Senior Perioperative Coordinator, on 12/14/2023  P: [phone]

## 2023-12-14 NOTE — TELEPHONE ENCOUNTER
RN Care Coordinator: Christen Winkler    Surgery is scheduled with Dr. Geronimo  Date: 3/8   Location: [facility]      H&P to be completed by: Primary Care team - patient instructed to schedule. Patient states they will schedule with Anusha Fermin NP.     Surgical consult: 2/23 with Dr. Geronimo, [facility]    Post-op: 3/22 with Dayna Urena PA-C, [facility]      Patient will receive a phone call from pre-admission nurses 1-2 days prior to surgery with arrival time and NPO instructions.       Spoke with the patient and was able to confirm all scheduled information.       Patient questions/concerns: Questions about recovery & restrictions. Will have RNCC reach out to patient to discuss.       Surgery packet: to be sent via US mail    __    Arabella Adams Senior Perioperative Coordinator, on 12/14/2023 at 2:57 PM  P: [phone]

## 2024-01-08 ENCOUNTER — PATIENT OUTREACH (OUTPATIENT)
Dept: PLASTIC SURGERY | Facility: CLINIC | Age: 64
End: 2024-01-08
Payer: COMMERCIAL

## 2024-01-08 NOTE — TELEPHONE ENCOUNTER
Pt is scheduled for [facility], spoke with her about her questions and time off of work. She will scheduled pre-op H&P and will let us know if she has more questions before her pre-surgery consult with Dr Geronimo.

## 2024-02-12 ENCOUNTER — OFFICE VISIT (OUTPATIENT)
Dept: INTERNAL MEDICINE | Facility: CLINIC | Age: 64
End: 2024-02-12
Payer: COMMERCIAL

## 2024-02-12 VITALS
SYSTOLIC BLOOD PRESSURE: 150 MMHG | HEIGHT: 64 IN | TEMPERATURE: 97.9 F | RESPIRATION RATE: 16 BRPM | DIASTOLIC BLOOD PRESSURE: 82 MMHG | BODY MASS INDEX: 26.72 KG/M2 | WEIGHT: 156.5 LBS | OXYGEN SATURATION: 100 % | HEART RATE: 67 BPM

## 2024-02-12 DIAGNOSIS — I10 PRIMARY HYPERTENSION: ICD-10-CM

## 2024-02-12 DIAGNOSIS — E78.2 MIXED HYPERLIPIDEMIA: ICD-10-CM

## 2024-02-12 DIAGNOSIS — N62 MACROMASTIA: ICD-10-CM

## 2024-02-12 DIAGNOSIS — Z78.0 POST-MENOPAUSAL: ICD-10-CM

## 2024-02-12 DIAGNOSIS — Z01.818 PREOPERATIVE EXAMINATION: Primary | ICD-10-CM

## 2024-02-12 LAB
ANION GAP SERPL CALCULATED.3IONS-SCNC: 9 MMOL/L (ref 7–15)
BUN SERPL-MCNC: 17.8 MG/DL (ref 8–23)
CALCIUM SERPL-MCNC: 9.7 MG/DL (ref 8.8–10.2)
CHLORIDE SERPL-SCNC: 104 MMOL/L (ref 98–107)
CHOLEST SERPL-MCNC: 185 MG/DL
CREAT SERPL-MCNC: 0.83 MG/DL (ref 0.51–0.95)
DEPRECATED HCO3 PLAS-SCNC: 27 MMOL/L (ref 22–29)
EGFRCR SERPLBLD CKD-EPI 2021: 78 ML/MIN/1.73M2
ERYTHROCYTE [DISTWIDTH] IN BLOOD BY AUTOMATED COUNT: 11.6 % (ref 10–15)
FASTING STATUS PATIENT QL REPORTED: ABNORMAL
GLUCOSE SERPL-MCNC: 102 MG/DL (ref 70–99)
HCT VFR BLD AUTO: 40.4 % (ref 35–47)
HDLC SERPL-MCNC: 55 MG/DL
HGB BLD-MCNC: 13.6 G/DL (ref 11.7–15.7)
LDLC SERPL CALC-MCNC: 113 MG/DL
MCH RBC QN AUTO: 31.4 PG (ref 26.5–33)
MCHC RBC AUTO-ENTMCNC: 33.7 G/DL (ref 31.5–36.5)
MCV RBC AUTO: 93 FL (ref 78–100)
NONHDLC SERPL-MCNC: 130 MG/DL
PLATELET # BLD AUTO: 267 10E3/UL (ref 150–450)
POTASSIUM SERPL-SCNC: 4.2 MMOL/L (ref 3.4–5.3)
RBC # BLD AUTO: 4.33 10E6/UL (ref 3.8–5.2)
SODIUM SERPL-SCNC: 140 MMOL/L (ref 135–145)
TRIGL SERPL-MCNC: 83 MG/DL
WBC # BLD AUTO: 6.5 10E3/UL (ref 4–11)

## 2024-02-12 PROCEDURE — 36415 COLL VENOUS BLD VENIPUNCTURE: CPT | Performed by: NURSE PRACTITIONER

## 2024-02-12 PROCEDURE — 90471 IMMUNIZATION ADMIN: CPT | Performed by: NURSE PRACTITIONER

## 2024-02-12 PROCEDURE — 80048 BASIC METABOLIC PNL TOTAL CA: CPT | Performed by: NURSE PRACTITIONER

## 2024-02-12 PROCEDURE — 80061 LIPID PANEL: CPT | Performed by: NURSE PRACTITIONER

## 2024-02-12 PROCEDURE — 85027 COMPLETE CBC AUTOMATED: CPT | Performed by: NURSE PRACTITIONER

## 2024-02-12 PROCEDURE — 93005 ELECTROCARDIOGRAM TRACING: CPT | Performed by: NURSE PRACTITIONER

## 2024-02-12 PROCEDURE — 99214 OFFICE O/P EST MOD 30 MIN: CPT | Mod: 25 | Performed by: NURSE PRACTITIONER

## 2024-02-12 PROCEDURE — 90677 PCV20 VACCINE IM: CPT | Performed by: NURSE PRACTITIONER

## 2024-02-12 RX ORDER — ZINC GLUCONATE 50 MG
50 TABLET ORAL DAILY
COMMUNITY

## 2024-02-12 RX ORDER — MULTIVITAMIN WITH IRON
1 TABLET ORAL DAILY
COMMUNITY

## 2024-02-12 RX ORDER — LISINOPRIL 20 MG/1
20 TABLET ORAL DAILY
Qty: 90 TABLET | Refills: 0 | Status: SHIPPED | OUTPATIENT
Start: 2024-02-12 | End: 2024-05-16

## 2024-02-12 RX ORDER — CHLORHEXIDINE GLUCONATE 4 %
3 LIQUID (ML) TOPICAL DAILY
COMMUNITY

## 2024-02-12 ASSESSMENT — PAIN SCALES - GENERAL: PAINLEVEL: NO PAIN (0)

## 2024-02-12 NOTE — ASSESSMENT & PLAN NOTE
She has been focusing on lifestyle interventions for lipid management  - Repeat lipid profile today  - If LDL >100, will increase atorvastatin to 80 mg daily

## 2024-02-12 NOTE — ASSESSMENT & PLAN NOTE
Hypertensive   INCREASE lisinopril to 20 mg daily. Ideally I would like blood pressure to be closer to 130/80 but at least less than 140/90.   Check BP at home or return for nurse visit BP check in about 2 weeks

## 2024-02-12 NOTE — PROGRESS NOTES
Preoperative Evaluation  [address] [address]  Phone: [phone]  Fax: [phone]  Primary Provider: Anusha Fermin  Pre-op Performing Provider: ANUSHA FERMIN  Feb 12, 2024       [place] is a 63 year old, presenting for the following:  Pre-Op Exam (MAMMOPLASTY, REDUCTION, BILATERAL)        2/12/2024    10:11 AM   Additional Questions   Roomed by LOTTIE Almazan   Accompanied by SARAH     Surgical Information  Surgery/Procedure: MAMMOPLASTY, REDUCTION, BILATERAL   Surgery Location:  [facility] OR   Surgeon: ALINA Geronimo MD   Surgery Date: 3/8/2024   Time of Surgery:  8:00 AM   Where patient plans to recover: At home with family  Fax number for surgical facility: Note does not need to be faxed, will be available electronically in Epic.    Assessment & Plan     The proposed surgical procedure is considered INTERMEDIATE risk.    Problem List Items Addressed This Visit       HLD (hyperlipidemia)     She has been focusing on lifestyle interventions for lipid management  - Repeat lipid profile today  - If LDL >100, will increase atorvastatin to 80 mg daily          Relevant Medications    atorvastatin (LIPITOR) 80 MG tablet    Other Relevant Orders    Lipid panel reflex to direct LDL Fasting (Completed)    Basic metabolic panel (Completed)    HTN (hypertension)     Hypertensive   INCREASE lisinopril to 20 mg daily. Ideally I would like blood pressure to be closer to 130/80 but at least less than 140/90.   Check BP at home or return for nurse visit BP check in about 2 weeks          Relevant Medications    lisinopril (ZESTRIL) 20 MG tablet    Macromastia     Planned surgical reduction mammoplasty           Other Visit Diagnoses       Preoperative examination    -  Primary    Relevant Orders    EKG 12-lead, tracing only (Completed)    Basic metabolic panel (Completed)    CBC with platelets (Completed)    Post-menopausal        Relevant Orders    DEXA  HIP/PELVIS/SPINE - Future                - No identified additional risk factors other than previously addressed    Antiplatelet or Anticoagulation Medication Instructions   - Patient is on no antiplatelet or anticoagulation medications.    Additional Medication Instructions  Patient is to take all scheduled medications on the day of surgery EXCEPT for modifications listed below:  - Stop supplements 7 days prior to surgery    Recommendation  APPROVAL GIVEN to proceed with proposed procedure, without further diagnostic evaluation.      Subjective   HPI related to upcoming procedure: She is here for a preoperative visit for a reduction mammoplasty. She has chronic back pain, shoulder pain, deep indentations in the shoulders from bra straps.     HTN- no recent home BP checks. Her last office visit her reading was elevated.         2/12/2024    10:06 AM   Preop Questions   1. Have you ever had a heart attack or stroke? No   2. Have you ever had surgery on your heart or blood vessels, such as a stent placement, a coronary artery bypass, or surgery on an artery in your head, neck, heart, or legs? No   3. Do you have chest pain with activity? No   4. Do you have a history of  heart failure? No   5. Do you currently have a cold, bronchitis or symptoms of other infection? No   6. Do you have a cough, shortness of breath, or wheezing? No   7. Do you or anyone in your family have previous history of blood clots? No   8. Do you or does anyone in your family have a serious bleeding problem such as prolonged bleeding following surgeries or cuts? No   9. Have you ever had problems with anemia or been told to take iron pills? No   10. Have you had any abnormal blood loss such as black, tarry or bloody stools, or abnormal vaginal bleeding? No   11. Have you ever had a blood transfusion? No   12. Are you willing to have a blood transfusion if it is medically needed before, during, or after your surgery? Yes   13. Have you or any of  your relatives ever had problems with anesthesia? No   14. Do you have sleep apnea, excessive snoring or daytime drowsiness? No   15. Do you have any artifical heart valves or other implanted medical devices like a pacemaker, defibrillator, or continuous glucose monitor? No   16. Do you have artificial joints? No   17. Are you allergic to latex? No       Health Care Directive  Patient does not have a Health Care Directive or Living Will: Discussed advance care planning with patient; however, patient declined at this time.    Preoperative Review of    reviewed - no record of controlled substances prescribed.        Patient Active Problem List    Diagnosis Date Noted    Cervical cancer screening 10/18/2023     Priority: Medium     No further cervical cancer screening recommended.     Pt age 62  Pt needs 3 consecutive negative pap tests or 2 consecutive negative cotests within 10 years with the last one being in the last 5 years, before pap screening can be discontinued.    No history of SHARDA 2 or greater found in epic.   Pap history below.  03/21/17 NIL Pap, Neg HR HPV   10/10/23 NIL Pap, Neg HR HPV         Macromastia 10/10/2023     Priority: Medium     Causing back pain, deep shoulder indentations, difficulty finding a comfortable bra    10/10/23- referral to plastic surgery       Impaired fasting glucose 07/05/2022     Priority: Medium    Benign neoplasm of descending colon 07/02/2021     Priority: Medium    Diverticular disease of large intestine 06/30/2021     Priority: Medium    HLD (hyperlipidemia)      Priority: Medium     Atorvastatin         HTN (hypertension) 02/27/2019     Priority: Medium    History of colonic polyps 10/07/2015     Priority: Medium      Past Medical History:   Diagnosis Date    Fibrocystic breast      Past Surgical History:   Procedure Laterality Date    OTHER SURGICAL HISTORY      no surgical history     Current Outpatient Medications   Medication Sig Dispense Refill    atorvastatin  (LIPITOR) 80 MG tablet Take 1 tablet (80 mg) by mouth daily 90 tablet 3    cholecalciferol (VITAMIN D3) 25 mcg (1000 units) capsule Take 1 capsule by mouth daily      lisinopril (ZESTRIL) 20 MG tablet Take 1 tablet (20 mg) by mouth daily 90 tablet 0    magnesium 250 MG tablet Take 1 tablet by mouth daily      Multiple Vitamins-Minerals (HAIR SKIN & NAILS) TABS Take 3 tablets by mouth daily      zinc gluconate 50 MG tablet Take 50 mg by mouth daily         No Known Allergies     Social History     Tobacco Use    Smoking status: Former     Packs/day: 0.25     Years: 30.00     Additional pack years: 0.00     Total pack years: 7.50     Types: Cigarettes     Quit date: 1/8/2009     Years since quitting: 15.1    Smokeless tobacco: Never   Substance Use Topics    Alcohol use: Yes     Alcohol/week: 7.0 standard drinks of alcohol     Comment: Alcoholic Drinks/day: socially on weekend     Family History   Problem Relation Age of Onset    Heart Disease Father     Hypertension Mother     Pacemaker Mother     Hypertension Sister     Hypertension Brother     Hypertension Sister     Hypertension Sister     Hypertension Sister     Hypertension Brother        History   Drug Use No             Review of Systems- pertinent positive in bold:  Constitutional: Fever, chills, night sweats, fainting,weight change, fatigue, seizures, dizziness, sleeping difficulties, loud snoring/pauses in breathing  Eyes: change in vision, blurred or double vision, redness/eye pain  Ears, nose, mouth, throat: change in hearing,ear pain, hoarseness, difficulty swallowing, sores in the mouth or throat  Respiratory: shortness of breath, cough, bloody sputum, wheezing  Cardiovascular: chest pain, palpitations   Gastrointestinal: abdominalpain, heartburn/indigestion, nausea/vomiting, change in appetite, change in bowel habits, constipation or diarrhea, rectal bleeding/dark stools, difficulty swallowing  Urinary: painful urination, frequent urination,urinary  "urgency/incontinence, blood in urine/dark urine, nocturia  Musculoskeletal: backache/back pain (new or increasing), weakness, joint pain/stiffness (new orincreasing), muscle cramps, swelling of hands, feet, ankles, leg pain/redness  Skin: change in moles/freckles, rash, nodules  Hematologic/lymphatic: swollen lymph glands, abnormal bruising/bleeding  Endocrine:excessive thirst/urination, cold or heat intolerance  Breast: breast lump, breast pain, nipple discharge/skin changes  Neurologic/emotional: worrisome memory change, numbness/tingling, anxiety, mood swings      Objective    BP (!) 150/82 (BP Location: Left arm, Patient Position: Sitting, Cuff Size: Adult Regular)   Pulse 67   Temp 97.9  F (36.6  C) (Oral)   Resp 16   Ht 1.631 m (5' 4.2\")   Wt 71 kg (156 lb 8 oz)   LMP  (LMP Unknown)   SpO2 100%   BMI 26.70 kg/m     Estimated body mass index is 26.7 kg/m  as calculated from the following:    Height as of this encounter: 1.631 m (5' 4.2\").    Weight as of this encounter: 71 kg (156 lb 8 oz).    Physical Exam  GENERAL: alert and no distress  EYES: Eyes grossly normal to inspection, conjunctivae and sclerae normal  HENT: ear canals and TM's normal, mouth without ulcers or lesions  NECK: no adenopathy, no asymmetry, masses, or scars  RESP: lungs clear to auscultation - no rales, rhonchi or wheezes  CV: regular rate and rhythm, normal S1 S2, no S3 or S4, no murmur, click or rub, no peripheral edema  ABDOMEN: soft, nontender, no hepatosplenomegaly, no masses and bowel sounds normal  MS: no gross musculoskeletal defects noted, no edema  NEURO: Normal strength and tone, mentation intact and speech normal  PSYCH: mentation appears normal, affect normal/bright    Recent Labs   Lab Test 10/10/23  1417 07/05/22  0910   HGB 14.9 14.3    233    139   POTASSIUM 4.1 5.0   CR 0.79 0.85   A1C 5.3 5.5        Diagnostics  Labs pending at this time.  Results will be reviewed when available.   EKG: sinus " bradycardia    Revised Cardiac Risk Index (RCRI)  The patient has the following serious cardiovascular risks for perioperative complications:   - No serious cardiac risks = 0 points     RCRI Interpretation: 0 points: Class I (very low risk - 0.4% complication rate)         Signed Electronically by: Anusha Fermin NP  Copy of this evaluation report is provided to requesting physician.

## 2024-02-12 NOTE — PATIENT INSTRUCTIONS
INCREASE lisinopril to 20 mg daily. Ideally I would like blood pressure to be closer to 130/80 but at least less than 140/90.     Okay to take your usual prescription medications without interruption    Stop all supplements 7 days prior to the surgery     If you need a pain reliever before surgery, acetaminophen (Tylenol) would be a safe option within 1 week of surgery

## 2024-02-13 LAB
ATRIAL RATE - MUSE: 59 BPM
DIASTOLIC BLOOD PRESSURE - MUSE: NORMAL MMHG
INTERPRETATION ECG - MUSE: NORMAL
P AXIS - MUSE: 39 DEGREES
PR INTERVAL - MUSE: 176 MS
QRS DURATION - MUSE: 78 MS
QT - MUSE: 426 MS
QTC - MUSE: 421 MS
R AXIS - MUSE: -12 DEGREES
SYSTOLIC BLOOD PRESSURE - MUSE: NORMAL MMHG
T AXIS - MUSE: 16 DEGREES
VENTRICULAR RATE- MUSE: 59 BPM

## 2024-02-13 PROCEDURE — 93010 ELECTROCARDIOGRAM REPORT: CPT | Performed by: INTERNAL MEDICINE

## 2024-02-13 RX ORDER — ATORVASTATIN CALCIUM 80 MG/1
80 TABLET, FILM COATED ORAL DAILY
Qty: 90 TABLET | Refills: 3 | Status: SHIPPED | OUTPATIENT
Start: 2024-02-13

## 2024-02-23 ENCOUNTER — CARE COORDINATION (OUTPATIENT)
Dept: SURGERY | Facility: CLINIC | Age: 64
End: 2024-02-23

## 2024-02-23 ENCOUNTER — OFFICE VISIT (OUTPATIENT)
Dept: SURGERY | Facility: CLINIC | Age: 64
End: 2024-02-23
Payer: COMMERCIAL

## 2024-02-23 DIAGNOSIS — N62 MACROMASTIA: Primary | ICD-10-CM

## 2024-02-23 PROCEDURE — 99214 OFFICE O/P EST MOD 30 MIN: CPT | Performed by: PLASTIC SURGERY

## 2024-02-23 NOTE — PROGRESS NOTES
PREOPERATIVE VISIT NOTE     PRESENTING COMPLAINT:  Preop visit for upcoming breast reduction on 3/8/2024     HISTORY OF PRESENTING COMPLAINT: The patient is here for a pre-op visit for the patient's surgery.  The patient is being seen in the presence of my nurse. There is no change since the patient's consultation. Please see the consult note for details.     No change in history and physical exam.  Wants to be around a C cup.  [employer] [employer] [facility] [employer].  Last mammogram October 2023 BI-RADS 1.     ASSESSMENT AND PLAN:  Based upon the above findings, the patient is here for a preop visit for upcoming surgery.  I had a xiomara, detailed discussion with the patient in the presence of my nurse (who was present from beginning to end) about the proposed surgery. I was very clear and detailed about overview of surgery, perioperative plans, and expectations. All risks, benefits and alternatives of the surgery including but not limited to pain, infection, bleeding, scarring, asymmetry, seromas, hematomas, wound breakdown, wound dehiscence, prominent scar, hypertrophic scar, keloid scar, requirement of further surgeries, skin/tissue necrosis, nerve/muscle/deeper structure injury, asymmetries, nipple necrosis, requirement of a free nipple graft, inability to promise a cup size, DVT, PE, MI, CVA, pneumonia, renal failure and death, were explained in detail. The patient agreed and understood them all and had all the questions answered to the patient's satisfaction, and the patient wants to proceed with surgery. I look forward to helping the patient out in the near future.  All questions were answered.  The patient was happy with the visit.    Total time spent in the encounter today including chart review, visit itself, and post-visit paperwork was 30 minutes.

## 2024-02-23 NOTE — LETTER
2/23/2024         RE: Monse Park  [address] [facility] [address]        Dear Colleague,    Thank you for referring your patient, Monse Park, to the [facility]. Please see a copy of my visit note below.    PREOPERATIVE VISIT NOTE     PRESENTING COMPLAINT:  Preop visit for upcoming breast reduction on 3/8/2024     HISTORY OF PRESENTING COMPLAINT: The patient is here for a pre-op visit for the patient's surgery.  The patient is being seen in the presence of my nurse. There is no change since the patient's consultation. Please see the consult note for details.     No change in history and physical exam.  Wants to be around a C cup.  [employer].  Last mammogram October 2023 BI-RADS 1.     ASSESSMENT AND PLAN:  Based upon the above findings, the patient is here for a preop visit for upcoming surgery.  I had a xiomara, detailed discussion with the patient in the presence of my nurse (who was present from beginning to end) about the proposed surgery. I was very clear and detailed about overview of surgery, perioperative plans, and expectations. All risks, benefits and alternatives of the surgery including but not limited to pain, infection, bleeding, scarring, asymmetry, seromas, hematomas, wound breakdown, wound dehiscence, prominent scar, hypertrophic scar, keloid scar, requirement of further surgeries, skin/tissue necrosis, nerve/muscle/deeper structure injury, asymmetries, nipple necrosis, requirement of a free nipple graft, inability to promise a cup size, DVT, PE, MI, CVA, pneumonia, renal failure and death, were explained in detail. The patient agreed and understood them all and had all the questions answered to the patient's satisfaction, and the patient wants to proceed with surgery. I look forward to helping the patient out in the near future.  All questions were answered.  The patient was happy with the visit.    Total time spent in  the encounter today including chart review, visit itself, and post-visit paperwork was 30 minutes.       Again, thank you for allowing me to participate in the care of your patient.        Sincerely,        ALINA Geronimo MD

## 2024-02-23 NOTE — NURSING NOTE
Monse Park's goals for this visit include:   Chief Complaint   Patient presents with    RECHECK     Pre-op completed and cleared for surgery ce discuss bilateral breast reduction on 3/8       She requests these members of her care team be copied on today's visit information:     PCP: Anusha Fermin    Referring Provider:  No referring provider defined for this encounter.    LMP  (LMP Unknown)     Do you need any medication refills at today's visit?     Desi Low MA on 2/23/2024 at 2:00 PM

## 2024-03-03 ENCOUNTER — ANESTHESIA EVENT (OUTPATIENT)
Dept: SURGERY | Facility: AMBULATORY SURGERY CENTER | Age: 64
End: 2024-03-03
Payer: COMMERCIAL

## 2024-03-03 NOTE — ANESTHESIA PREPROCEDURE EVALUATION
Anesthesia Pre-Procedure Evaluation    Patient: Monse Park   MRN: [id number] : 1960        Procedure : Procedure(s):  MAMMOPLASTY, REDUCTION, BILATERAL          Past Medical History:   Diagnosis Date    Fibrocystic breast       Past Surgical History:   Procedure Laterality Date    OTHER SURGICAL HISTORY      no surgical history      No Known Allergies   Social History     Tobacco Use    Smoking status: Former     Packs/day: 0.25     Years: 30.00     Additional pack years: 0.00     Total pack years: 7.50     Types: Cigarettes     Quit date: 2009     Years since quitting: 15.1    Smokeless tobacco: Never   Substance Use Topics    Alcohol use: Yes     Alcohol/week: 7.0 standard drinks of alcohol     Comment: Alcoholic Drinks/day: socially on weekend      Wt Readings from Last 1 Encounters:   24 71 kg (156 lb 8 oz)        Anesthesia Evaluation            ROS/MED HX  ENT/Pulmonary:       Neurologic:       Cardiovascular:     (+) Dyslipidemia hypertension- -   -  - -                                      METS/Exercise Tolerance:     Hematologic:       Musculoskeletal:       GI/Hepatic:       Renal/Genitourinary:       Endo:       Psychiatric/Substance Use:       Infectious Disease:       Malignancy:       Other:            Physical Exam    Airway  airway exam normal      Mallampati: II       Respiratory Devices and Support         Dental       (+) Minor Abnormalities - some fillings, tiny chips      Cardiovascular   cardiovascular exam normal          Pulmonary   pulmonary exam normal                OUTSIDE LABS:  CBC:   Lab Results   Component Value Date    WBC 6.5 2024    WBC 5.9 10/10/2023    HGB 13.6 2024    HGB 14.9 10/10/2023    HCT 40.4 2024    HCT 43.4 10/10/2023     2024     10/10/2023     BMP:   Lab Results   Component Value Date     2024     10/10/2023    POTASSIUM 4.2 2024    POTASSIUM 4.1 10/10/2023    CHLORIDE 104  "02/12/2024    CHLORIDE 101 10/10/2023    CO2 27 02/12/2024    CO2 25 10/10/2023    BUN 17.8 02/12/2024    BUN 12.4 10/10/2023    CR 0.83 02/12/2024    CR 0.79 10/10/2023     (H) 02/12/2024     (H) 10/10/2023     COAGS: No results found for: \"PTT\", \"INR\", \"FIBR\"  POC: No results found for: \"BGM\", \"HCG\", \"HCGS\"  HEPATIC:   Lab Results   Component Value Date    ALBUMIN 4.2 03/31/2021    PROTTOTAL 7.1 03/31/2021    ALT 26 10/10/2023    AST 17 03/31/2021    ALKPHOS 84 03/31/2021    BILITOTAL 0.5 03/31/2021     OTHER:   Lab Results   Component Value Date    A1C 5.3 10/10/2023    MADISON 9.7 02/12/2024       Anesthesia Plan    ASA Status:  2    NPO Status:  NPO Appropriate    Anesthesia Type: General.     - Airway: LMA   Induction: Intravenous, Propofol.   Maintenance: TIVA.        Consents    Anesthesia Plan(s) and associated risks, benefits, and realistic alternatives discussed. Questions answered and patient/representative(s) expressed understanding.     - Discussed: Risks, Benefits and Alternatives for the PROCEDURE were discussed     - Discussed with:  Patient            Postoperative Care    Pain management: Oral pain medications, IV analgesics, Multi-modal analgesia, Peripheral nerve block (Single Shot).   PONV prophylaxis: Ondansetron (or other 5HT-3), Dexamethasone or Solumedrol, Background Propofol Infusion     Comments:               Keron Griggs MD    I have reviewed the pertinent notes and labs in the chart from the past 30 days and (re)examined the patient.  Any updates or changes from those notes are reflected in this note.              # Overweight: Estimated body mass index is 26.7 kg/m  as calculated from the following:    Height as of 2/12/24: 1.631 m (5' 4.2\").    Weight as of 2/12/24: 71 kg (156 lb 8 oz).      "

## 2024-03-08 ENCOUNTER — HOSPITAL ENCOUNTER (OUTPATIENT)
Facility: AMBULATORY SURGERY CENTER | Age: 64
Discharge: HOME | End: 2024-03-08
Attending: PLASTIC SURGERY | Admitting: PLASTIC SURGERY
Payer: COMMERCIAL

## 2024-03-08 ENCOUNTER — TELEPHONE (OUTPATIENT)
Dept: SURGERY | Facility: CLINIC | Age: 64
End: 2024-03-08

## 2024-03-08 ENCOUNTER — ANESTHESIA (OUTPATIENT)
Dept: SURGERY | Facility: AMBULATORY SURGERY CENTER | Age: 64
End: 2024-03-08
Payer: COMMERCIAL

## 2024-03-08 VITALS
WEIGHT: 155 LBS | DIASTOLIC BLOOD PRESSURE: 84 MMHG | BODY MASS INDEX: 26.46 KG/M2 | TEMPERATURE: 97.7 F | SYSTOLIC BLOOD PRESSURE: 131 MMHG | HEIGHT: 64 IN | RESPIRATION RATE: 16 BRPM | OXYGEN SATURATION: 98 % | HEART RATE: 80 BPM

## 2024-03-08 DIAGNOSIS — N62 MACROMASTIA: Primary | ICD-10-CM

## 2024-03-08 PROCEDURE — 19318 BREAST REDUCTION: CPT | Performed by: REGISTERED NURSE

## 2024-03-08 PROCEDURE — G8907 PT DOC NO EVENTS ON DISCHARG: HCPCS

## 2024-03-08 PROCEDURE — G8916 PT W IV AB GIVEN ON TIME: HCPCS

## 2024-03-08 PROCEDURE — 19318 BREAST REDUCTION: CPT | Performed by: ANESTHESIOLOGY

## 2024-03-08 PROCEDURE — 19318 BREAST REDUCTION: CPT | Mod: 50

## 2024-03-08 PROCEDURE — 88305 TISSUE EXAM BY PATHOLOGIST: CPT | Performed by: STUDENT IN AN ORGANIZED HEALTH CARE EDUCATION/TRAINING PROGRAM

## 2024-03-08 PROCEDURE — 19318 BREAST REDUCTION: CPT | Mod: 50 | Performed by: PLASTIC SURGERY

## 2024-03-08 PROCEDURE — 64450 NJX AA&/STRD OTHER PN/BRANCH: CPT | Mod: 59 | Performed by: ANESTHESIOLOGY

## 2024-03-08 RX ORDER — ONDANSETRON 4 MG/1
4 TABLET, ORALLY DISINTEGRATING ORAL EVERY 8 HOURS PRN
Qty: 4 TABLET | Refills: 0 | Status: SHIPPED | OUTPATIENT
Start: 2024-03-08

## 2024-03-08 RX ORDER — EPHEDRINE SULFATE 50 MG/ML
INJECTION, SOLUTION INTRAMUSCULAR; INTRAVENOUS; SUBCUTANEOUS PRN
Status: DISCONTINUED | OUTPATIENT
Start: 2024-03-08 | End: 2024-03-08

## 2024-03-08 RX ORDER — FENTANYL CITRATE 50 UG/ML
50 INJECTION, SOLUTION INTRAMUSCULAR; INTRAVENOUS EVERY 5 MIN PRN
Status: DISCONTINUED | OUTPATIENT
Start: 2024-03-08 | End: 2024-03-09 | Stop reason: HOSPADM

## 2024-03-08 RX ORDER — FLUMAZENIL 0.1 MG/ML
0.2 INJECTION, SOLUTION INTRAVENOUS
Status: DISCONTINUED | OUTPATIENT
Start: 2024-03-08 | End: 2024-03-09 | Stop reason: HOSPADM

## 2024-03-08 RX ORDER — OXYCODONE HYDROCHLORIDE 5 MG/1
5-10 TABLET ORAL EVERY 6 HOURS PRN
Qty: 20 TABLET | Refills: 0 | Status: SHIPPED | OUTPATIENT
Start: 2024-03-08

## 2024-03-08 RX ORDER — FENTANYL CITRATE 50 UG/ML
25-50 INJECTION, SOLUTION INTRAMUSCULAR; INTRAVENOUS
Status: DISCONTINUED | OUTPATIENT
Start: 2024-03-08 | End: 2024-03-09 | Stop reason: HOSPADM

## 2024-03-08 RX ORDER — FENTANYL CITRATE 50 UG/ML
25 INJECTION, SOLUTION INTRAMUSCULAR; INTRAVENOUS EVERY 5 MIN PRN
Status: DISCONTINUED | OUTPATIENT
Start: 2024-03-08 | End: 2024-03-09 | Stop reason: HOSPADM

## 2024-03-08 RX ORDER — NALOXONE HYDROCHLORIDE 0.4 MG/ML
0.2 INJECTION, SOLUTION INTRAMUSCULAR; INTRAVENOUS; SUBCUTANEOUS
Status: DISCONTINUED | OUTPATIENT
Start: 2024-03-08 | End: 2024-03-09 | Stop reason: HOSPADM

## 2024-03-08 RX ORDER — ONDANSETRON 2 MG/ML
INJECTION INTRAMUSCULAR; INTRAVENOUS PRN
Status: DISCONTINUED | OUTPATIENT
Start: 2024-03-08 | End: 2024-03-08

## 2024-03-08 RX ORDER — NALOXONE HYDROCHLORIDE 0.4 MG/ML
0.4 INJECTION, SOLUTION INTRAMUSCULAR; INTRAVENOUS; SUBCUTANEOUS
Status: DISCONTINUED | OUTPATIENT
Start: 2024-03-08 | End: 2024-03-09 | Stop reason: HOSPADM

## 2024-03-08 RX ORDER — BUPIVACAINE HYDROCHLORIDE 2.5 MG/ML
INJECTION, SOLUTION EPIDURAL; INFILTRATION; INTRACAUDAL
Status: COMPLETED | OUTPATIENT
Start: 2024-03-08 | End: 2024-03-08

## 2024-03-08 RX ORDER — OXYCODONE HYDROCHLORIDE 5 MG/1
10 TABLET ORAL
Status: DISCONTINUED | OUTPATIENT
Start: 2024-03-08 | End: 2024-03-09 | Stop reason: HOSPADM

## 2024-03-08 RX ORDER — ONDANSETRON 4 MG/1
4 TABLET, ORALLY DISINTEGRATING ORAL EVERY 30 MIN PRN
Status: DISCONTINUED | OUTPATIENT
Start: 2024-03-08 | End: 2024-03-09 | Stop reason: HOSPADM

## 2024-03-08 RX ORDER — NALOXONE HYDROCHLORIDE 0.4 MG/ML
0.1 INJECTION, SOLUTION INTRAMUSCULAR; INTRAVENOUS; SUBCUTANEOUS
Status: DISCONTINUED | OUTPATIENT
Start: 2024-03-08 | End: 2024-03-09 | Stop reason: HOSPADM

## 2024-03-08 RX ORDER — ACETAMINOPHEN 325 MG/1
975 TABLET ORAL ONCE
Status: COMPLETED | OUTPATIENT
Start: 2024-03-08 | End: 2024-03-08

## 2024-03-08 RX ORDER — ONDANSETRON 2 MG/ML
4 INJECTION INTRAMUSCULAR; INTRAVENOUS EVERY 30 MIN PRN
Status: DISCONTINUED | OUTPATIENT
Start: 2024-03-08 | End: 2024-03-09 | Stop reason: HOSPADM

## 2024-03-08 RX ORDER — PROPOFOL 10 MG/ML
INJECTION, EMULSION INTRAVENOUS CONTINUOUS PRN
Status: DISCONTINUED | OUTPATIENT
Start: 2024-03-08 | End: 2024-03-08

## 2024-03-08 RX ORDER — OXYCODONE HYDROCHLORIDE 5 MG/1
5 TABLET ORAL
Status: COMPLETED | OUTPATIENT
Start: 2024-03-08 | End: 2024-03-08

## 2024-03-08 RX ORDER — SODIUM CHLORIDE, SODIUM LACTATE, POTASSIUM CHLORIDE, CALCIUM CHLORIDE 600; 310; 30; 20 MG/100ML; MG/100ML; MG/100ML; MG/100ML
INJECTION, SOLUTION INTRAVENOUS CONTINUOUS
Status: DISCONTINUED | OUTPATIENT
Start: 2024-03-08 | End: 2024-03-09 | Stop reason: HOSPADM

## 2024-03-08 RX ORDER — DEXAMETHASONE SODIUM PHOSPHATE 4 MG/ML
INJECTION, SOLUTION INTRA-ARTICULAR; INTRALESIONAL; INTRAMUSCULAR; INTRAVENOUS; SOFT TISSUE PRN
Status: DISCONTINUED | OUTPATIENT
Start: 2024-03-08 | End: 2024-03-08

## 2024-03-08 RX ORDER — CEFAZOLIN SODIUM 2 G/50ML
2 SOLUTION INTRAVENOUS
Status: COMPLETED | OUTPATIENT
Start: 2024-03-08 | End: 2024-03-08

## 2024-03-08 RX ORDER — LIDOCAINE HYDROCHLORIDE 20 MG/ML
INJECTION, SOLUTION INFILTRATION; PERINEURAL PRN
Status: DISCONTINUED | OUTPATIENT
Start: 2024-03-08 | End: 2024-03-08

## 2024-03-08 RX ORDER — LIDOCAINE 40 MG/G
CREAM TOPICAL
Status: DISCONTINUED | OUTPATIENT
Start: 2024-03-08 | End: 2024-03-09 | Stop reason: HOSPADM

## 2024-03-08 RX ORDER — AMOXICILLIN 250 MG
1-2 CAPSULE ORAL 2 TIMES DAILY
Qty: 30 TABLET | Refills: 0 | Status: SHIPPED | OUTPATIENT
Start: 2024-03-08

## 2024-03-08 RX ORDER — FENTANYL CITRATE 50 UG/ML
INJECTION, SOLUTION INTRAMUSCULAR; INTRAVENOUS PRN
Status: DISCONTINUED | OUTPATIENT
Start: 2024-03-08 | End: 2024-03-08

## 2024-03-08 RX ORDER — PROPOFOL 10 MG/ML
INJECTION, EMULSION INTRAVENOUS PRN
Status: DISCONTINUED | OUTPATIENT
Start: 2024-03-08 | End: 2024-03-08

## 2024-03-08 RX ORDER — CEFAZOLIN SODIUM 2 G/50ML
2 SOLUTION INTRAVENOUS SEE ADMIN INSTRUCTIONS
Status: DISCONTINUED | OUTPATIENT
Start: 2024-03-08 | End: 2024-03-09 | Stop reason: HOSPADM

## 2024-03-08 RX ADMIN — Medication 0.2 MG: at 08:56

## 2024-03-08 RX ADMIN — EPHEDRINE SULFATE 5 MG: 50 INJECTION, SOLUTION INTRAMUSCULAR; INTRAVENOUS; SUBCUTANEOUS at 09:19

## 2024-03-08 RX ADMIN — BUPIVACAINE HYDROCHLORIDE 20 ML: 2.5 INJECTION, SOLUTION EPIDURAL; INFILTRATION; INTRACAUDAL at 08:06

## 2024-03-08 RX ADMIN — OXYCODONE HYDROCHLORIDE 5 MG: 5 TABLET ORAL at 10:34

## 2024-03-08 RX ADMIN — PROPOFOL 40 MG: 10 INJECTION, EMULSION INTRAVENOUS at 08:58

## 2024-03-08 RX ADMIN — Medication 0.2 MG: at 09:44

## 2024-03-08 RX ADMIN — LIDOCAINE HYDROCHLORIDE 50 MG: 20 INJECTION, SOLUTION INFILTRATION; PERINEURAL at 08:25

## 2024-03-08 RX ADMIN — SODIUM CHLORIDE, SODIUM LACTATE, POTASSIUM CHLORIDE, CALCIUM CHLORIDE: 600; 310; 30; 20 INJECTION, SOLUTION INTRAVENOUS at 08:19

## 2024-03-08 RX ADMIN — FENTANYL CITRATE 100 MCG: 50 INJECTION, SOLUTION INTRAMUSCULAR; INTRAVENOUS at 08:25

## 2024-03-08 RX ADMIN — EPHEDRINE SULFATE 5 MG: 50 INJECTION, SOLUTION INTRAMUSCULAR; INTRAVENOUS; SUBCUTANEOUS at 08:37

## 2024-03-08 RX ADMIN — EPHEDRINE SULFATE 5 MG: 50 INJECTION, SOLUTION INTRAMUSCULAR; INTRAVENOUS; SUBCUTANEOUS at 08:32

## 2024-03-08 RX ADMIN — ACETAMINOPHEN 975 MG: 325 TABLET ORAL at 07:15

## 2024-03-08 RX ADMIN — CEFAZOLIN SODIUM 2 G: 2 SOLUTION INTRAVENOUS at 08:19

## 2024-03-08 RX ADMIN — PROPOFOL 150 MG: 10 INJECTION, EMULSION INTRAVENOUS at 08:25

## 2024-03-08 RX ADMIN — FENTANYL CITRATE 50 MCG: 50 INJECTION, SOLUTION INTRAMUSCULAR; INTRAVENOUS at 07:54

## 2024-03-08 RX ADMIN — PROPOFOL 50 MCG/KG/MIN: 10 INJECTION, EMULSION INTRAVENOUS at 08:36

## 2024-03-08 RX ADMIN — DEXAMETHASONE SODIUM PHOSPHATE 4 MG: 4 INJECTION, SOLUTION INTRA-ARTICULAR; INTRALESIONAL; INTRAMUSCULAR; INTRAVENOUS; SOFT TISSUE at 08:57

## 2024-03-08 RX ADMIN — ONDANSETRON 4 MG: 2 INJECTION INTRAMUSCULAR; INTRAVENOUS at 09:58

## 2024-03-08 NOTE — ANESTHESIA CARE TRANSFER NOTE
Patient: Mnose Park    Procedure: Procedure(s):  MAMMOPLASTY, REDUCTION, BILATERAL       Diagnosis: Macromastia [N62]  Diagnosis Additional Information: No value filed.    Anesthesia Type:   General     Note:    Oropharynx: oropharynx clear of all foreign objects and spontaneously breathing  Level of Consciousness: drowsy  Oxygen Supplementation: nasal cannula  Level of Supplemental Oxygen (L/min / FiO2): 6  Independent Airway: airway patency satisfactory and stable  Dentition: dentition unchanged  Vital Signs Stable: post-procedure vital signs reviewed and stable  Report to RN Given: handoff report given  Patient transferred to: PACU    Handoff Report: Identifed the Patient, Identified the Reponsible Provider, Reviewed the pertinent medical history, Discussed the surgical course, Reviewed Intra-OP anesthesia mangement and issues during anesthesia, Set expectations for post-procedure period and Allowed opportunity for questions and acknowledgement of understanding      Vitals:  Vitals Value Taken Time   /70 03/08/24 1009   Temp 97.9    Pulse 79 03/08/24 1011   Resp 9 03/08/24 1011   SpO2 100 % 03/08/24 1011   Vitals shown include unfiled device data.    Electronically Signed By: JONATHAN Bruce CRNA  March 8, 2024  10:12 AM

## 2024-03-08 NOTE — TELEPHONE ENCOUNTER
FMLA forms completed and faxed. Successful transmission confirmed via right fax..Christen Ford RN

## 2024-03-08 NOTE — ANESTHESIA PROCEDURE NOTES
Airway       Patient location during procedure: OR  Staff -        CRNA: Selina Gonzalez APRN CRNA       Performed By: CRNA  Consent for Airway        Urgency: elective  Indications and Patient Condition       Indications for airway management: laverne-procedural       Induction type:intravenous       Mask difficulty assessment: 1 - vent by mask    Final Airway Details       Final airway type: supraglottic airway    Supraglottic Airway Details        Type: LMA       Brand: LMA Unique       LMA size: 4    Post intubation assessment        Placement verified by: capnometry, equal breath sounds and chest rise        Number of attempts at approach: 1       Secured with: tape       Ease of procedure: easy       Dentition: Intact and Unchanged

## 2024-03-08 NOTE — ANESTHESIA PROCEDURE NOTES
Pectoralis Procedure Note    Pre-Procedure   Staff -        Anesthesiologist:  Keron Griggs MD       Performed By: anesthesiologist       Location: pre-op       Procedure Start/Stop Times: 3/8/2024 7:56 AM and 3/8/2024 8:06 AM       Pre-Anesthestic Checklist: patient identified, IV checked, site marked, risks and benefits discussed, informed consent, monitors and equipment checked, pre-op evaluation, at physician/surgeon's request and post-op pain management  Timeout:       Correct Patient: Yes        Correct Procedure: Yes        Correct Site: Yes        Correct Position: Yes        Correct Laterality: Yes        Site Marked: Yes  Procedure Documentation  Procedure: Pectoralis             Pectoralis I and Pectoralis II       Diagnosis: BILATERAL MAMMOPLASTY       Laterality: bilateral       Patient Position: sitting       Skin prep: Chloraprep       Needle Type: short bevel       Needle Gauge: 21.        Needle Length (millimeters): 100        Ultrasound guided       1. Ultrasound was used to identify targeted nerve, plexus, vascular marker, or fascial plane and place a needle adjacent to it in real-time.       2. Ultrasound was used to visualize the spread of anesthetic in close proximity to the above referenced structure.       3. A permanent image is entered into the patient's record.    Assessment/Narrative         The placement was negative for: blood aspirated, painful injection and site bleeding       Paresthesias: No.       Bolus given via needle..        Secured via.        Insertion/Infusion Method: Single Shot       Complications: none       Injection made incrementally with aspirations every 5 mL.    Medication(s) Administered   Bupivacaine 0.25% PF (Infiltration) - Infiltration   20 mL - 3/8/2024 8:06:00 AM  Bupivacaine liposome (Exparel) 1.3% LA inj susp (Infiltration) - Infiltration   20 mL - 3/8/2024 8:06:00 AM  Medication Administration Time: 3/8/2024 7:56 AM     Comments:  Exparel 266  "mg      FOR [facility] ([facility]) ONLY:   Pain Team Contact information: please page the Pain Team [employer]. Search \"Pain\". During daytime hours, please page the attending first. At night please page the resident first.      "

## 2024-03-08 NOTE — DISCHARGE INSTRUCTIONS
BREAST REDUCTION POST-OPERATIVE INSTRUCTIONS    Instructions      Have someone drive you home after surgery and help you at home for 1-2      days.     Get plenty of rest.     Follow balanced diet.     Decreased activity may promote constipation, so you may want to add      more raw fruit to your diet, and be sure to increase fluid intake. Your doctor       may also order a stool softener.     Do not drink alcohol when taking pain medications.     If you are taking vitamins with iron, resume these as tolerated.     Do not smoke, as smoking delays healing and increases the risk of      Complications.    Medications      Please take stool softeners while taking narcotic medications to prevent constipation       You may take tylenol or ibuprofen (eg other NSAIDS) after surgery instead of or in addition to the prescribed narcotic pain medications.     Activities     Do not drive while taking narcotic pain medications and while experiencing any pain.      Do not drive until you have full range of motion with your arms and can stop the car       or swerve in an emergency.     Start walking the evening of surgery, this helps to reduce swelling and       lowers the chance of blood clots.     Refrain from vigorous activities for 2-6 weeks.  Increase activity gradually as tolerated.      After 2 weeks, you may perform lower body exercise, but must wait the full 6 weeks       prior to performing upper body exercise     Avoid lifting anything over 5 pounds for 2 weeks.     Resume social and employment activities in about 2 weeks (if not too strenuous).    Incision Care     You may shower the next day after surgery. The ACE wrap (if used) may be rewrapped as needed (if too tight or loose). Use it for support and may subtitute with a sports bra if preferred.      Avoid exposing scars to sun for at least 12 months.     Always use a strong sunblock, if sun exposure is unavoidable (SPF 50 or      greater).     Keep the tape on  "incisions until it starts to curl up on the ends, and then gently remove them.        You may use moisturizing cream (eg Aquaphor or Vaseline) at 10 days to help the tape come off. By two weeks,      you may pull off the tape off the incisions if still in place.     Wear your surgical bra/wrap 24/7 as directed for 4 weeks.     Avoid bras with stays and underwires for 4-6 weeks.     You may pad the incisions with gauze for comfort (panty liners also work well as they        are absorbent and inexpensive).     Inspect daily for signs of infection.     No tub soaking, bathing, or swimming until wounds are healed.     If your breast skin is dry after surgery, you can apply a moisturizer several times a       day.     What to Expect     Despite layered closing of your incisions, there will be some oozing       of tissue fluid from incision sites. This is expected, especially for the first 2 days or so.  This will soak up on the gauze and the bra       to look like it is more than it really is. If the draining continues past 2 days, with pain, discomfort, and large volumes, please call the clinic.      Most of the higher discomfort will subside after the first few days.     You may experience temporary soreness, bruising, swelling and tightness in the       breasts as well as discomfort in the incision area.     You may have have normal sensation in the nipples. This may be more or less than usual, and usually returns over a couple of months.     Your first menstruation following surgery may cause your breasts to swell and hurt.     You may have random shooting pains, tingling, or other strange sensations in the skin for a few months. These will subside.    Appearance     Most of the discoloration and swelling will subside in 2-4 weeks.     Your breasts will feel firm to the touch initially, but will soften with time.     A more natural shape will occur as the breasts \"settle\" in a slightly lower position over the first " few months.     Scars may be red and thick for 6-12 months (longer in lighter-skinned patients).  In time, these usually soften and fade.    Follow-Up Care     Typically, you will have a post op check at 1-2 weeks, and again with your surgeon in another month.    When to Call     If you have increased swelling or bruising, particular one side greater than the other.     If swelling and redness persist after a few days.     If you have increased redness along the incision.     If you have severe or increased pain not relieved by medication.     If you have any side effects to medications; such as, rash, nausea,      headache, vomiting, or constipation.     If you have an oral temperature over 100.4 degrees.     If you have any yellowish or greenish drainage from the incisions or      notice a foul odor.     If you have bleeding from the incisions that is difficult to control with      light pressure.     If you have loss of feeling or motion.     Any unanswered concern.    For Medical Questions, Please Call:  ?   [phone], Monday - Friday, 8 a.m. - 4:30 p.m.  ?   After hours and on weekends, call Hospital Paging at [phone] and       ask for the Plastic Surgery Resident on call.  [facility]  Same-Day Surgery   Adult Discharge Orders & Instructions   For 24 hours after surgery  Get plenty of rest.  A responsible adult must stay with you for at least 24 hours after you leave the hospital.   Do not drive or use heavy equipment.  If you have weakness or tingling, don't drive or use heavy equipment until this feeling goes away.  Do not drink alcohol.  Avoid strenuous or risky activities.  Ask for help when climbing stairs.   You may feel lightheaded.  IF so, sit for a few minutes before standing.  Have someone help you get up.   If you have nausea (feel sick to your stomach): Drink only clear liquids such as apple juice, ginger ale, broth or 7-Up.  Rest may also help.  Be sure to drink  enough fluids.  Move to a regular diet as you feel able.  You may have a slight fever. Call the doctor if your fever is over 100 F (37.7 C) (taken under the tongue) or lasts longer than 24 hours.  You may have a dry mouth, a sore throat, muscle aches or trouble sleeping.  These should go away after 24 hours.  Do not make important or legal decisions.   Call your doctor for any of the followin.  Signs of infection (fever, growing tenderness at the surgery site, a large amount of drainage or bleeding, severe pain, foul-smelling drainage, redness, swelling).    2. It has been over 8 to 10 hours since surgery and you are still not able to urinate (pass water).    3.  Headache for over 24 hours.    4.  Numbness, tingling or weakness the day after surgery (if you had spinal anesthesia).  Tylenol 975 mg was given at 7:15 am. Give every 4-6 hours  You should not take more then 4,000 mg of tylenol/acetaminophen in a 24 hour period.

## 2024-03-08 NOTE — OP NOTE
PREOPERATIVE DIAGNOSIS: Symptomatic bilateral breast hypertrophy.     POSTOPERATIVE DIAGNOSIS: Symptomatic bilateral breast hypertrophy.     PROCEDURES: Bilateral superomedial pedicle inverted T skin closure breast reduction.     SURGEON: Harry Geronimo MD.     RESIDENTS: Boris Tillman MD; Janice Underwood MD.    ANESTHESIA: General anesthesia with LMA.    COMPLICATIONS: Nil.     DRAINS: Nil.     Blood Loss: 150 mL    SPECIMENS: Skin and breast tissue from right breast measuring about 688 g, left breast measuring about 693 g.     DESCRIPTION OF PROCEDURE: After informed consent was taken, the proper site and procedure was ascertained with the patient and was appropriately marked and taken to in the operating room.  She was placed in supine position with the knees comfortably flexed with pillows underneath them, and pneumoboots placed and running prior to induction of anesthesia. Preoperative antibiotics given in the OR. All pressure points were appropriately padded. General anesthesia was administered without any complications. She was placed in such a position that she could be flexed to about 50 degrees. Her arms were padded and abducted to about 50 degrees. She was prepped and draped in a standard surgical fashion. I began by first remarking the preop markings and marking a 42 mm areola on each side. I then marked out a superior medial pedicle on each side. I then de-epithelialized the pedicle on each side. I then dissected out the pedicle on each side without actually seeing the deep fascia and also released the pedicle such that it could rotate into its new nipple position without any tension. I then went ahead and on each side excised the inferomedial, inferior, inferolateral and lateral aspects of the breast according to a vertical pattern reduction. Strict hemostasis was ensured during this entire part of the case. Once this was done, I then temporarily closed the patient's breast in an inverted T fashion, sat the  patient up ensured symmetry and then marked out the new areolar opening symmetrically on each side. I then went ahead and closed the horizontal incision using 2-0 Monocryl suture in a deep dermal layer. Then I made sure that the nipple areolar complex could be retrieved without any tension, which is could on each side. I then checked that they were both pink and viable, which they were. I then went ahead and excised the skin of the new areolar opening and de-epithelialized epithelialized the portion that was involved in the pedicle on each side. I then sutured in the nipple areolar complex using 2-0 Monocryl suture in a deep dermal fashion circumferentially. I then closed the vertical incision using 2-0 Monocryl suture to approximate the medial and lateral pillars, and then the deep dermis. I then ran all the incisions with 3-0 Stratafix  suture in a running intracuticular manner followed by placement of Prineo, and then followed by an ACE wrap. At the end of the case, the patient's breasts were soft, nipples were pink, and breasts were symmetric. The patient tolerated the procedure well. All counts correct at the end of the case. The patient was extubated and sent to recovery room in a stable condition.

## 2024-03-08 NOTE — TELEPHONE ENCOUNTER
FMLA paperwork received from [employer].  Placed on Sunni's desk.    Sunni stallings Clinic Assistant- Surgical Specialties

## 2024-03-12 LAB
PATH REPORT.COMMENTS IMP SPEC: NORMAL
PATH REPORT.COMMENTS IMP SPEC: NORMAL
PATH REPORT.FINAL DX SPEC: NORMAL
PATH REPORT.GROSS SPEC: NORMAL
PATH REPORT.MICROSCOPIC SPEC OTHER STN: NORMAL
PATH REPORT.RELEVANT HX SPEC: NORMAL
PHOTO IMAGE: NORMAL

## 2024-03-17 NOTE — PROGRESS NOTES
[facility] - Primary [facility] Specialists    Comprehensive and complex medical care - Chronic disease management - Shared decision making - Care coordination - Compassionate care    Patient advocacy - Rational deprescribing - Minimally disruptive medicine - Ethical focus - Customized care         Date of Service: 3/17/2023  Primary Provider: Anusha Fermin    Patient Care Team:  Anusha Fermin NP as PCP - General  Anusha Fermin NP as Referring Physician  Anusha Fermin NP as Assigned PCP          Patient's Pharmacy:    [id number] [employer] - [place], [place] - [address]  [address] [facility] [address]  Phone: [phone] Fax: [phone]     Patient's Contacts:  Name Home Phone Work Phone Mobile Phone Relationship Lgl Grd   GEORGERUY [phone]   Spouse    GEORGEMAYELA [phone]   Son      Patient's Insurance:    Payor: [employer] / Plan: [employer] ADVANTAGE / Product Type: HMO /           Active Problem List:  Problem List as of 3/17/2023 Reviewed: 3/17/2023  3:37 PM by Jimy Dunn MD       Other    HLD (hyperlipidemia)    Last Assessment & Plan 7/5/2022 Office Visit Written 7/5/2022  8:48 AM by Anusha Fermin NP     Repeat lipid panel today.  Continue atorvastatin for primary prevention.         HTN (hypertension)    Last Assessment & Plan 7/5/2022 Office Visit Written 7/5/2022  8:48 AM by Anusha Fermin NP     Blood pressure was initially elevated but improved with recheck.  She is advised to monitor this with occasional home blood pressure checks.   if systolic readings are consistently greater than 140, I recommend that we make an adjustment to lisinopril and increase it to 20 mg daily.  Follow-up blood pressure check in the office in 6 months.            Other    Impaired fasting glucose    Last Assessment & Plan 7/5/2022 Office Visit Written 7/5/2022  8:47 AM by Anusha Fermin NP     Slightly elevated fasting glucose level with last year's blood check.  We  "will plan to check an A1c today.         Benign neoplasm of descending colon    Diverticular disease of large intestine    History of colonic polyps        Current Outpatient Medications   Medication Instructions     atorvastatin (LIPITOR) 40 mg, Oral, DAILY     benzonatate (TESSALON) 200 mg, Oral, 3 TIMES DAILY PRN     lisinopril (ZESTRIL) 10 mg, Oral, DAILY     Social History     Social History Narrative     Not on file       Subjective:     Monse Park is a 62 year old female who comes in today for:    Chief Complaint   Patient presents with     Office Visit     Pt states that she woke up today feeling dizzy; this is this first time this has happened.       Comes in today with episode of spinning in bed this am at 5 am.  Woke up and the room was spinning.  Never has had anything like this in the past.  Tried to get up and leaned over and it got worse and had nausea as well.  Has not thrown up with this.  No other neurologic symptoms and no headaches. No speech issues.  Not continuous.  Could keep it under control as long as she had her head in a static position.      Got up around 7 am and then it was still present.  Finally these symptoms went away at 9 am and has not had it since.    Has hypertension and is on lisinopril for this and this generally has been doing well.    We reviewed her other issues noted in the assessment but not specifically addressed in the HPI above.     Objective:     Wt Readings from Last 3 Encounters:   03/17/23 70.5 kg (155 lb 6.4 oz)   07/05/22 68.3 kg (150 lb 8 oz)   03/31/21 70.8 kg (156 lb)     BP Readings from Last 3 Encounters:   03/17/23 (!) 142/83   07/05/22 138/80   03/31/21 128/82     BP (!) 142/83 (BP Location: Right arm, Patient Position: Sitting, Cuff Size: Adult Regular)   Pulse 104   Temp 98.1  F (36.7  C) (Oral)   Ht 1.638 m (5' 4.5\")   Wt 70.5 kg (155 lb 6.4 oz)   SpO2 97%   BMI 26.26 kg/m     The patient is comfortable, no acute distress.  Mood good.  " Insight good.  Eyes are nonicteric.  Neck is supple without mass.  No cervical adenopathy.  No thyromegaly. Heart regular rate and rhythm.  Lungs clear to auscultation bilaterally.  Respiratory effort is good.  Extremities no edema.  Estephania Hallpike maneuver was normal in both directions at this time.    Diagnostics:     Office Visit on 07/05/2022   Component Date Value Ref Range Status     WBC Count 07/05/2022 5.7  4.0 - 11.0 10e3/uL Final     RBC Count 07/05/2022 4.46  3.80 - 5.20 10e6/uL Final     Hemoglobin 07/05/2022 14.3  11.7 - 15.7 g/dL Final     Hematocrit 07/05/2022 43.0  35.0 - 47.0 % Final     MCV 07/05/2022 96  78 - 100 fL Final     MCH 07/05/2022 32.1  26.5 - 33.0 pg Final     MCHC 07/05/2022 33.3  31.5 - 36.5 g/dL Final     RDW 07/05/2022 11.8  10.0 - 15.0 % Final     Platelet Count 07/05/2022 233  150 - 450 10e3/uL Final     Hemoglobin A1C 07/05/2022 5.5  0.0 - 5.6 % Final    Normal <5.7%   Prediabetes 5.7-6.4%    Diabetes 6.5% or higher     Note: Adopted from ADA consensus guidelines.     Creatinine 07/05/2022 0.85  0.51 - 0.95 mg/dL Final     Sodium 07/05/2022 139  136 - 145 mmol/L Final     Potassium 07/05/2022 5.0  3.4 - 5.3 mmol/L Final     Urea Nitrogen 07/05/2022 14.7  8.0 - 23.0 mg/dL Final     Chloride 07/05/2022 102  98 - 107 mmol/L Final     Carbon Dioxide (CO2) 07/05/2022 28  22 - 29 mmol/L Final     Anion Gap 07/05/2022 9  7 - 15 mmol/L Final     Glucose 07/05/2022 117 (H)  70 - 99 mg/dL Final     GFR Estimate 07/05/2022 77  >60 mL/min/1.73m2 Final    Effective December 21, 2021 eGFRcr in adults is calculated using the 2021 CKD-EPI creatinine equation which includes age and gender (Nasreen et al., NEJM, DOI: 10.1056/TVMCdo8031657)     Calcium 07/05/2022 10.0  8.8 - 10.2 mg/dL Final     Cholesterol 07/05/2022 229 (H)  <200 mg/dL Final     Triglycerides 07/05/2022 121  <150 mg/dL Final     Direct Measure HDL 07/05/2022 81  >=50 mg/dL Final     LDL Cholesterol Calculated 07/05/2022 124 (H)   <=100 mg/dL Final     Non HDL Cholesterol 07/05/2022 148 (H)  <130 mg/dL Final     ALT 07/05/2022 19  10 - 35 U/L Final       No results found for any visits on 03/17/23.    Assessment:     1. Benign paroxysmal positional vertigo, unspecified laterality    2. Primary hypertension        Plan:     1. Information on benign positional vertigo (BPV) given today.  2. Declined meclizine prescription but given over the counter (OTC) options.  3. Could see physical therapy or occupational therapy for this in the future if needed.  4. Continue current medications otherwise.  5. Follow up sooner if issues.     30 minutes or greater was spent today on the patient's care on the day of service.      This includes time for chart preparation, reviewing medical tests done before or during the visit, talking with the patient, review of quality indicators, required documentation, and other elements of care.        Jimy Dunn MD  General Internal Medicine  [facility]    Return in about 4 months (around 7/17/2023) for physical, follow up with your primary care provider.     No future appointments.       no

## 2024-03-22 ENCOUNTER — TELEPHONE (OUTPATIENT)
Dept: PLASTIC SURGERY | Facility: CLINIC | Age: 64
End: 2024-03-22

## 2024-03-22 NOTE — TELEPHONE ENCOUNTER
Left Voicemail (1st Attempt), sent myc for the patient to call back and schedule the following:    Appointment type: Post-op  Provider: Dayna Urena  Return date: Reschedule 3/22   Specialty phone number: [phone]  Additional appointment(s) needed: n/a  Additonal Notes: Pt requested to reschedule. Per Christen MILLER RN, ok to reschedule with Dayna or Dr. Geronimo on 3/26.      Left direct #

## 2024-03-25 NOTE — PROGRESS NOTES
Plastic Surgery Outpatient Visit  *video visit*    ID: Monse Park is a 63 year old female s/p bilateral breast reduction 3/8/2024 with Dr. Geronimo     S: Doing well. No major concerns. Didn't have much pain. No issues with drainage, tape came off at 10 days post op.     O:  LMP  (LMP Unknown)    General: NAD  Chest:bilateral breast incisions c/d/I, nipples intact, viable. R vertical limb incision with 2 small scabs.     PATH: benign breast tissue    A/P:  -healing well  -moisturize with vaseline or aquafor for a few weeks, then ok to start scar care if desired with silicone strips, bio oil, vitamin E oil  -ok to clip small spitting stitch if bothersome  -continue soft bra and lifting restrictions, <10lbs, until 6 weeks post op. Then ok to increase activity slowly  -RTC PRN    Dayna Urena PA-C  Plastic and Reconstructive Surgery    15 minutes spent on the date of the encounter doing chart review, history and physical, dressing changes, documentation and further activity as noted above.

## 2024-03-25 NOTE — TELEPHONE ENCOUNTER
Reached pt to reschedule. Pt agreed to see Dayna Urena tomorrow 3/26 via video appt. Per Christen MILLER RN pt needs to send photos via [employer] beforehand. Pt agreed to send photos and will connect via video on 3/26 at 8 am

## 2024-03-26 ENCOUNTER — VIRTUAL VISIT (OUTPATIENT)
Dept: PLASTIC SURGERY | Facility: CLINIC | Age: 64
End: 2024-03-26
Payer: COMMERCIAL

## 2024-03-26 DIAGNOSIS — N62 MACROMASTIA: Primary | ICD-10-CM

## 2024-03-26 PROCEDURE — 99024 POSTOP FOLLOW-UP VISIT: CPT | Mod: 95 | Performed by: PHYSICIAN ASSISTANT

## 2024-03-26 ASSESSMENT — PAIN SCALES - GENERAL: PAINLEVEL: NO PAIN (0)

## 2024-03-26 NOTE — LETTER
3/26/2024       RE: Monse Park  [address] [facility] [address]     Dear Colleague,    Thank you for referring your patient, Monse Park, to the [facility] PLASTIC AND RECONSTRU[facility]IVE SURGERY CLINIC [place] at [facility]. Please see a copy of my visit note below.    Plastic Surgery Outpatient Visit  *video visit*    ID: Monse Park is a 63 year old female s/p bilateral breast reduction 3/8/2024 with Dr. Geronimo     S: Doing well. No major concerns. Didn't have much pain. No issues with drainage, tape came off at 10 days post op.     O:  LMP  (LMP Unknown)    General: NAD  Chest:bilateral breast incisions c/d/I, nipples intact, viable. R vertical limb incision with 2 small scabs.     PATH: benign breast tissue    A/P:  -healing well  -moisturize with vaseline or aquafor for a few weeks, then ok to start scar care if desired with silicone strips, bio oil, vitamin E oil  -ok to clip small spitting stitch if bothersome  -continue soft bra and lifting restrictions, <10lbs, until 6 weeks post op. Then ok to increase activity slowly  -RTC PRN    15 minutes spent on the date of the encounter doing chart review, history and physical, dressing changes, documentation and further activity as noted above.       Again, thank you for allowing me to participate in the care of your patient.      Sincerely,    Dayna Urena PA-C

## 2024-04-03 ENCOUNTER — NURSE TRIAGE (OUTPATIENT)
Dept: NURSING | Facility: CLINIC | Age: 64
End: 2024-04-03

## 2024-04-03 ENCOUNTER — MYC MEDICAL ADVICE (OUTPATIENT)
Dept: PLASTIC SURGERY | Facility: CLINIC | Age: 64
End: 2024-04-03
Payer: COMMERCIAL

## 2024-04-03 RX ORDER — SULFAMETHOXAZOLE/TRIMETHOPRIM 800-160 MG
1 TABLET ORAL 2 TIMES DAILY
Status: DISCONTINUED | OUTPATIENT
Start: 2024-04-03 | End: 2024-04-03 | Stop reason: HOSPADM

## 2024-04-03 NOTE — TELEPHONE ENCOUNTER
63 year old s/p Bilateral superomedial pedicle inverted T skin closure breast reduction on 3/8/24   She calls because over the last few days she has noted redness along right breast incision   She states it is slightly warm  She noted a few drops of yellow drainage on Monday evening   She states the redness increases as the day goes on - May be from rubbing of sports bra  She has tried a thin panty liner     Instructed her to use a thicker panty liner   Send my chart picture to plastic surgery team and I will forward information to them                   Reason for Disposition   Caller has NON-URGENT question and triager unable to answer question    Additional Information   Negative: Major abdominal surgical incision and wound gaping open with visible internal organs   Negative: Sounds like a life-threatening emergency to the triager   Negative: Bleeding from incision and won't stop after 10 minutes of direct pressure   Negative: Bleeding (more than a few drops) from incision and after tracheostomy or blood vessel surgery (e.g., carotid endarterectomy, femoral bypass graft, kidney dialysis fistula)   Negative: Bright red, wide-spread, sunburn-like rash   Negative: SEVERE pain in the incision   Negative: Incision gaping open and < 2 days (48 hours) since wound re-opened   Negative: Incision gaping open and length of opening > 2 inches (5 cm)   Negative: Patient sounds very sick or weak to the triager   Negative: Sounds like a serious complication to the triager   Negative: Fever > 100.4 F (38.0 C)   Negative: Incision looks infected (spreading redness, pain)   Negative: Red streak runs from the incision and longer than 1 inch (2.5 cm)   Negative: Pus or bad-smelling fluid draining from incision   Negative: Dressing soaked with blood or body fluid (e.g., drainage)   Negative: Raised bruise and size > 2 inches (5 cm) and expanding   Negative: Scant bleeding (e.g., few drops) from incision and after tracheostomy or  "blood vessel surgery (e.g., carotid endarterectomy, femoral bypass graft, kidney dialysis fistula   Negative: Caller has URGENT question and triager unable to answer question   Negative: Clear or blood-tinged fluid draining from wound and no fever   Negative: Suture or staple removal is overdue   Negative: Patient wants to be seen   Negative: Incision on the face gaping open and length of opening > 1/4 inch (6 mm), and over 2 days since wound re-opened   Negative: Incision gaping open and length of opening > 1/2 inch (1 cm) and over 2 days since wound re-opened    Answer Assessment - Initial Assessment Questions  1. SYMPTOM: \"What's the main symptom you're concerned about?\" (e.g., drainage, incision opening up, pain, redness)      redness  2. ONSET: \"When did redness  start?\"      A few days ago  3. SURGERY: \"What surgery did you have?\"      Bilateral superomedial pedicle inverted T skin closure breast reduction.   4. DATE of SURGERY: \"When was the surgery?\"       3/8/24  5. INCISION SITE: \"Where is the incision located?\"       Right breast  6. REDNESS: \"Is there any redness at the incision site?\" If Yes, ask: \"How wide across is the redness?\" (Inches, centimeters)       Yes    7. PAIN: \"Is there any pain?\" If Yes, ask: \"How bad is it?\"  (Scale 1-10; or mild, moderate, severe)    - NONE (0): no pain    - MILD (1-3): doesn't interfere with normal activities     - MODERATE (4-7): interferes with normal activities or awakens from sleep     - SEVERE (8-10): excruciating pain, unable to do any normal activities      no  8. BLEEDING: \"Is there any bleeding?\" If Yes, ask: \"How much?\" and \"Where?\"      no  9. DRAINAGE: \"Is there any drainage from the incision site?\" If Yes, ask: \"What color and how much?\" (e.g., red, cloudy, pus; drops, teaspoon)     Monday evening noted a few drops of yellowish drainage   10. FEVER: \"Do you have a fever?\" If Yes, ask: \"What is your temperature, how was it measured, and when did it " "start?\"        no  11. OTHER SYMPTOMS: \"Do you have any other symptoms?\" (e.g., dizziness, rash elsewhere on body, shaking chills, weakness)        The area around the incision is slightly warm    Protocols used: Post-Op Incision Symptoms and [id number]    "

## 2024-04-03 NOTE — TELEPHONE ENCOUNTER
GABE Mcintosh called pt today, wrote note in pt's chart:    Spoke with Debbie re: breast concerns. Appears to have mild erythema, possible stitch granuloma forming. Denies fevers/chills, overall feels ok. Padding bra to avoid rubbing in the area.      Discussed starting antibiotics vs watching over the next few days. Prefers to start antibiotics, which I feel is reasonable. Bactrim Rx sent. Will follow up via mychart with any worsening symptoms for concerns, declines in person visit at this time.    William CEJA RN

## 2024-04-04 DIAGNOSIS — L03.313 CELLULITIS OF CHEST WALL: Primary | ICD-10-CM

## 2024-04-04 RX ORDER — SULFAMETHOXAZOLE/TRIMETHOPRIM 800-160 MG
1 TABLET ORAL 2 TIMES DAILY
Status: DISCONTINUED | OUTPATIENT
Start: 2024-04-04 | End: 2024-04-04

## 2024-04-04 RX ORDER — SULFAMETHOXAZOLE/TRIMETHOPRIM 800-160 MG
1 TABLET ORAL 2 TIMES DAILY
Qty: 14 TABLET | Refills: 0 | Status: SHIPPED | OUTPATIENT
Start: 2024-04-04 | End: 2024-04-11

## 2024-04-12 ENCOUNTER — ANCILLARY PROCEDURE (OUTPATIENT)
Dept: MAMMOGRAPHY | Facility: CLINIC | Age: 64
End: 2024-04-12
Attending: PHYSICIAN ASSISTANT
Payer: COMMERCIAL

## 2024-04-12 ENCOUNTER — OFFICE VISIT (OUTPATIENT)
Dept: PLASTIC SURGERY | Facility: CLINIC | Age: 64
End: 2024-04-12
Payer: COMMERCIAL

## 2024-04-12 ENCOUNTER — LAB (OUTPATIENT)
Dept: LAB | Facility: CLINIC | Age: 64
End: 2024-04-12
Payer: COMMERCIAL

## 2024-04-12 VITALS
DIASTOLIC BLOOD PRESSURE: 82 MMHG | OXYGEN SATURATION: 98 % | WEIGHT: 151.1 LBS | TEMPERATURE: 97.4 F | SYSTOLIC BLOOD PRESSURE: 130 MMHG | HEIGHT: 64 IN | HEART RATE: 99 BPM | BODY MASS INDEX: 25.8 KG/M2

## 2024-04-12 DIAGNOSIS — Z98.890 S/P BILATERAL BREAST REDUCTION: ICD-10-CM

## 2024-04-12 DIAGNOSIS — N63.0 BREAST SWELLING: ICD-10-CM

## 2024-04-12 DIAGNOSIS — N62 MACROMASTIA: ICD-10-CM

## 2024-04-12 DIAGNOSIS — N62 MACROMASTIA: Primary | ICD-10-CM

## 2024-04-12 DIAGNOSIS — N64.89 SEROMA OF BREAST: ICD-10-CM

## 2024-04-12 LAB
ERYTHROCYTE [DISTWIDTH] IN BLOOD BY AUTOMATED COUNT: 12.1 % (ref 10–15)
GRAM STAIN RESULT: NORMAL
GRAM STAIN RESULT: NORMAL
HCT VFR BLD AUTO: 38 % (ref 35–47)
HGB BLD-MCNC: 12.6 G/DL (ref 11.7–15.7)
MCH RBC QN AUTO: 30.4 PG (ref 26.5–33)
MCHC RBC AUTO-ENTMCNC: 33.2 G/DL (ref 31.5–36.5)
MCV RBC AUTO: 92 FL (ref 78–100)
PLATELET # BLD AUTO: 409 10E3/UL (ref 150–450)
RBC # BLD AUTO: 4.15 10E6/UL (ref 3.8–5.2)
WBC # BLD AUTO: 9.8 10E3/UL (ref 4–11)

## 2024-04-12 PROCEDURE — 99024 POSTOP FOLLOW-UP VISIT: CPT | Performed by: PHYSICIAN ASSISTANT

## 2024-04-12 PROCEDURE — 36415 COLL VENOUS BLD VENIPUNCTURE: CPT | Performed by: PATHOLOGY

## 2024-04-12 PROCEDURE — 99000 SPECIMEN HANDLING OFFICE-LAB: CPT | Performed by: PATHOLOGY

## 2024-04-12 PROCEDURE — 76642 ULTRASOUND BREAST LIMITED: CPT | Mod: RT | Performed by: RADIOLOGY

## 2024-04-12 PROCEDURE — 87075 CULTR BACTERIA EXCEPT BLOOD: CPT | Performed by: PHYSICIAN ASSISTANT

## 2024-04-12 PROCEDURE — 87205 SMEAR GRAM STAIN: CPT | Performed by: PHYSICIAN ASSISTANT

## 2024-04-12 PROCEDURE — 10160 PNXR ASPIR ABSC HMTMA BULLA: CPT | Performed by: RADIOLOGY

## 2024-04-12 PROCEDURE — 87070 CULTURE OTHR SPECIMN AEROBIC: CPT | Performed by: PHYSICIAN ASSISTANT

## 2024-04-12 PROCEDURE — 85027 COMPLETE CBC AUTOMATED: CPT | Performed by: PATHOLOGY

## 2024-04-12 PROCEDURE — 76942 ECHO GUIDE FOR BIOPSY: CPT | Mod: XU | Performed by: RADIOLOGY

## 2024-04-12 ASSESSMENT — PAIN SCALES - GENERAL: PAINLEVEL: NO PAIN (0)

## 2024-04-12 NOTE — PROGRESS NOTES
"Plastic Surgery Outpatient Visit    ID: France Park is a 63 year old female s/p bilateral breast reduction 3/8/2024 with Dr. Geronimo. Started on bactrim 4/4/2023 for R breast redness.    S: France notes redness, swelling to R breast for the past week. Redness improves slightly in the morning and worsens during the day. Has small amounts of yellow drainage that is new. She completed a course of bactrim with no improvement. Denies fevers/chills, overall feels ok. Does endorse being active, has grandchildren that she helps watch. First 3 weeks were without issues, this is new the past week.     O:  /82 (BP Location: Right arm, Patient Position: Sitting, Cuff Size: Adult Regular)   Pulse 99   Temp 97.4  F (36.3  C) (Oral)   Ht 1.626 m (5' 4\")   Wt 68.5 kg (151 lb 1.6 oz)   LMP  (LMP Unknown)   SpO2 98%   BMI 25.94 kg/m     General: NAD  Chest: R breast inferior pole with erythema. Pinpoint opening draining small amounts of serous fluid.   L breast soft, no significant swelling or erythema    A/P:  -R breast with swelling/erythema despite 1 week antibiotics. Concern for fluid collection, seroma/hematoma.   -R breast ultrasound today, aspiration if possible  -cbc    Dayna Uerna PA-C  Plastic and Reconstructive Surgery    35 minutes spent on the date of the encounter doing chart review, history and physical, dressing changes, documentation and further activity as noted above.    Addendum 2:20pm  CBC RESULTS:   Recent Labs   Lab Test 04/12/24  1238   WBC 9.8   RBC 4.15   HGB 12.6   HCT 38.0   MCV 92   MCH 30.4   MCHC 33.2   RDW 12.1        R breast with seroma on ultrasound, now s/p aspiration with radiology. 100cc cloudy fluid, sent for culture.     Instructed France to wrap/wear compression, decrease activities. Will follow redness, if worsening or fevers will restart antibiotics. Patient called, no answer, will send mychart message.     Dayna Urena PA-C  Plastic and Reconstructive " Surgery

## 2024-04-12 NOTE — LETTER
"4/12/2024       RE: Monse Park  [address] [facility] [place] [address]       Dear Colleague,    Thank you for referring your patient, Monse Park, to the [facility] PLASTIC AND RECONSTRUCTIVE SURGERY CLINIC [place] at [facility]. Please see a copy of my visit note below.    Plastic Surgery Outpatient Visit    ID: France Park is a 63 year old female s/p bilateral breast reduction 3/8/2024 with Dr. Geronimo. Started on bactrim 4/4/2023 for R breast redness.    S: France notes redness, swelling to R breast for the past week. Redness improves slightly in the morning and worsens during the day. Has small amounts of yellow drainage that is new. She completed a course of bactrim with no improvement. Denies fevers/chills, overall feels ok. Does endorse being active, has grandchildren that she helps watch. First 3 weeks were without issues, this is new the past week.     O:  /82 (BP Location: Right arm, Patient Position: Sitting, Cuff Size: Adult Regular)   Pulse 99   Temp 97.4  F (36.3  C) (Oral)   Ht 1.626 m (5' 4\")   Wt 68.5 kg (151 lb 1.6 oz)   LMP  (LMP Unknown)   SpO2 98%   BMI 25.94 kg/m     General: NAD  Chest: R breast inferior pole with erythema. Pinpoint opening draining small amounts of serous fluid.   L breast soft, no significant swelling or erythema    A/P:  -R breast with swelling/erythema despite 1 week antibiotics. Concern for fluid collection, seroma/hematoma.   -R breast ultrasound today, aspiration if possible  -cbc    35 minutes spent on the date of the encounter doing chart review, history and physical, dressing changes, documentation and further activity as noted above.    Addendum 2:20pm  CBC RESULTS:   Recent Labs   Lab Test 04/12/24  1238   WBC 9.8   RBC 4.15   HGB 12.6   HCT 38.0   MCV 92   MCH 30.4   MCHC 33.2   RDW 12.1        R breast with seroma on ultrasound, now s/p aspiration with " radiology. 100cc cloudy fluid, sent for culture.     Instructed France to wrap/wear compression, decrease activities. Will follow redness, if worsening or fevers will restart antibiotics. Patient called, no answer, will send [employer] message.         Again, thank you for allowing me to participate in the care of your patient.      Sincerely,    Dayna Urena PA-C

## 2024-04-12 NOTE — NURSING NOTE
"Chief Complaint   Patient presents with    Surgical Followup     5 week post-op, DOS 3/8/24.       Vitals:    04/12/24 1155 04/12/24 1158   BP: (!) 148/79 130/82   BP Location: Left arm Right arm   Patient Position: Sitting Sitting   Cuff Size: Adult Regular Adult Regular   Pulse: 99    Temp: 97.4  F (36.3  C)    TempSrc: Oral    SpO2: 98%    Weight: 151 lb 1.6 oz    Height: 5' 4\"        Body mass index is 25.94 kg/m .      Geremias Souza, EMT    "

## 2024-04-15 ENCOUNTER — DOCUMENTATION ONLY (OUTPATIENT)
Dept: SURGERY | Facility: CLINIC | Age: 64
End: 2024-04-15
Payer: COMMERCIAL

## 2024-04-15 LAB — BACTERIA ABSC ANAEROBE+AEROBE CULT: ABNORMAL

## 2024-04-16 ENCOUNTER — OFFICE VISIT (OUTPATIENT)
Dept: PLASTIC SURGERY | Facility: CLINIC | Age: 64
End: 2024-04-16
Attending: PLASTIC SURGERY
Payer: COMMERCIAL

## 2024-04-16 ENCOUNTER — TELEPHONE (OUTPATIENT)
Dept: SURGERY | Facility: CLINIC | Age: 64
End: 2024-04-16

## 2024-04-16 VITALS
HEART RATE: 111 BPM | SYSTOLIC BLOOD PRESSURE: 142 MMHG | OXYGEN SATURATION: 99 % | RESPIRATION RATE: 16 BRPM | BODY MASS INDEX: 25.63 KG/M2 | WEIGHT: 149.3 LBS | TEMPERATURE: 97.6 F | DIASTOLIC BLOOD PRESSURE: 85 MMHG

## 2024-04-16 DIAGNOSIS — Z98.890 S/P BILATERAL BREAST REDUCTION: Primary | ICD-10-CM

## 2024-04-16 PROCEDURE — 99213 OFFICE O/P EST LOW 20 MIN: CPT | Performed by: PLASTIC SURGERY

## 2024-04-16 PROCEDURE — 99024 POSTOP FOLLOW-UP VISIT: CPT | Performed by: PLASTIC SURGERY

## 2024-04-16 ASSESSMENT — PAIN SCALES - GENERAL: PAINLEVEL: NO PAIN (0)

## 2024-04-16 NOTE — PROGRESS NOTES
PRESENTING COMPLAINT:  Post-operative visit s/p bilateral breast reduction done on 3/8/2024.     HISTORY OF PRESENTING COMPLAINT: The patient is here for post-operative visit.  The patient is being seen in the presence of my nurse.     The patient is 4 weeks out from surgery.  Fully healed.  Had a seroma that needed to be aspirated.  Doing well.  Happy with results.  Pathology benign.    On exam vital signs stable afebrile.  Healing in well.  No evidence of infection seroma hematoma.  Tight inframammary folds.    ASSESSMENT AND PLAN:  Based upon the above findings, the patient is here for post-operative visit.     Advised aggressive moisturization, getting to know her breast well, yearly mammograms, see us back in 6 months.  I like to make sure that her lower poles are relaxed with a better inframammary fold appearance.      All questions were answered.  The patient was happy with the visit.

## 2024-04-16 NOTE — NURSING NOTE
"Oncology Rooming Note    April 16, 2024 11:31 AM   Monse Park is a 63 year old female who presents for:    Chief Complaint   Patient presents with    Oncology Clinic Visit     Initial Vitals: BP (!) 142/85 (BP Location: Left arm, Patient Position: Sitting, Cuff Size: Adult Small)   Pulse 111   Temp 97.6  F (36.4  C) (Oral)   Resp 16   Wt 67.7 kg (149 lb 4.8 oz)   LMP  (LMP Unknown)   SpO2 99%   BMI 25.63 kg/m   Estimated body mass index is 25.63 kg/m  as calculated from the following:    Height as of 4/12/24: 1.626 m (5' 4\").    Weight as of this encounter: 67.7 kg (149 lb 4.8 oz). Body surface area is 1.75 meters squared.  No Pain (0) Comment: Data Unavailable   No LMP recorded (lmp unknown). Patient is postmenopausal.  Allergies reviewed: Yes  Medications reviewed: Yes    Medications: Medication refills not needed today.  Pharmacy name entered into [facility]: [id number] [place], [place] - [address]     Frailty Screening:   Is the patient here for a new oncology consult visit in cancer care? 2. No      Clinical concerns: none       Cindy Brown              "

## 2024-04-16 NOTE — TELEPHONE ENCOUNTER
4-16 Called patient and left voicemail. Provided patient with [phone] as a call back number. Patient needs a 6 month follow up with Dr. Geronimo in [facility].     [facility]   Dermatology, Surgery, Urology  [facility]

## 2024-04-16 NOTE — LETTER
"April 30, 2024    Monse Park  [address] [facility] [address]      Dr. Grazyna Segovia has requested a follow-up in 6 months. You may have orders for other visits and tests as well.    Please call [phone] to schedule these visits.      (If you prefer, some clinics allow you to schedule at [email or web address]. Click the \"Visits\" tab, then choose \"Schedule an Appointment.\")        Sincerely, [facility]    "

## 2024-04-16 NOTE — LETTER
4/16/2024         RE: Monse FULLER Percysara  [address] [address]        Dear Colleague,    Thank you for referring your patient, Monse Park, to the [facility] BREAST [facility]. Please see a copy of my visit note below.    PRESENTING COMPLAINT:  Post-operative visit s/p bilateral breast reduction done on 3/8/2024.     HISTORY OF PRESENTING COMPLAINT: The patient is here for post-operative visit.  The patient is being seen in the presence of my nurse.     The patient is 4 weeks out from surgery.  Fully healed.  Had a seroma that needed to be aspirated.  Doing well.  Happy with results.  Pathology benign.    On exam vital signs stable afebrile.  Healing in well.  No evidence of infection seroma hematoma.  Tight inframammary folds.    ASSESSMENT AND PLAN:  Based upon the above findings, the patient is here for post-operative visit.     Advised aggressive moisturization, getting to know her breast well, yearly mammograms, see us back in 6 months.  I like to make sure that her lower poles are relaxed with a better inframammary fold appearance.      All questions were answered.  The patient was happy with the visit.        ALINA Geronimo MD

## 2024-04-19 LAB — BACTERIA ABSC ANAEROBE+AEROBE CULT: NORMAL

## 2024-04-23 NOTE — TELEPHONE ENCOUNTER
4/23 2nd attempt. Called patient and left voicemail. Provided patient with [phone] as a call back number. Patient needs a 6 month follow up with Dr. Geronimo in [facility].     Mychart sent to patient.     [facility]   Dermatology, Surgery, Urology  [facility] [place]

## 2024-04-30 NOTE — TELEPHONE ENCOUNTER
4/30 3rd attempt. Called patient and left voicemail. Provided patient with [phone] as a call back number. Patient needs a 6 month follow up with Dr. Geronimo in [facility].     Sending scheduling letter to patient. Max attempts reached to schedule. Closing encounter.     [facility]   Dermatology, Surgery, Urology  [facility] [place] [place]          BIB EMS after [facility] called for patient. Patient sts she is sad and depressed bc since her mother  5 months ago of a brain clot, her father has been blaming her for the death. She has had thoughts of self harm and SI due to her depression. Currently has no SI thoughts.

## 2024-05-12 DIAGNOSIS — I10 PRIMARY HYPERTENSION: ICD-10-CM

## 2024-05-16 RX ORDER — LISINOPRIL 20 MG/1
20 TABLET ORAL DAILY
Qty: 90 TABLET | Refills: 2 | Status: SHIPPED | OUTPATIENT
Start: 2024-05-16

## 2024-12-07 ENCOUNTER — HEALTH MAINTENANCE LETTER (OUTPATIENT)
Age: 64
End: 2024-12-07

## 2024-12-11 ENCOUNTER — ANCILLARY PROCEDURE (OUTPATIENT)
Dept: MAMMOGRAPHY | Facility: CLINIC | Age: 64
End: 2024-12-11
Attending: NURSE PRACTITIONER
Payer: COMMERCIAL

## 2024-12-11 DIAGNOSIS — Z12.31 SCREENING MAMMOGRAM FOR BREAST CANCER: ICD-10-CM

## 2024-12-11 PROCEDURE — 77067 SCR MAMMO BI INCL CAD: CPT

## 2024-12-11 PROCEDURE — 77063 BREAST TOMOSYNTHESIS BI: CPT

## 2025-03-25 DIAGNOSIS — I10 PRIMARY HYPERTENSION: ICD-10-CM

## 2025-03-25 RX ORDER — LISINOPRIL 20 MG/1
20 TABLET ORAL DAILY
Qty: 45 TABLET | Refills: 0 | OUTPATIENT
Start: 2025-03-25

## 2025-03-25 NOTE — TELEPHONE ENCOUNTER
Left message for patient to call back. When she calls back please relay message below and assist in scheduling appointment.

## 2025-03-26 RX ORDER — LISINOPRIL 20 MG/1
20 TABLET ORAL DAILY
Qty: 45 TABLET | Refills: 0 | Status: SHIPPED | OUTPATIENT
Start: 2025-03-26

## 2025-04-23 ENCOUNTER — OFFICE VISIT (OUTPATIENT)
Dept: INTERNAL MEDICINE | Facility: CLINIC | Age: 65
End: 2025-04-23
Payer: MEDICARE

## 2025-04-23 ENCOUNTER — TELEPHONE (OUTPATIENT)
Dept: INTERNAL MEDICINE | Facility: CLINIC | Age: 65
End: 2025-04-23

## 2025-04-23 VITALS
HEART RATE: 74 BPM | OXYGEN SATURATION: 97 % | DIASTOLIC BLOOD PRESSURE: 84 MMHG | RESPIRATION RATE: 12 BRPM | HEIGHT: 64 IN | WEIGHT: 158.5 LBS | TEMPERATURE: 97.8 F | SYSTOLIC BLOOD PRESSURE: 122 MMHG | BODY MASS INDEX: 27.06 KG/M2

## 2025-04-23 DIAGNOSIS — L91.8 SKIN TAG: ICD-10-CM

## 2025-04-23 DIAGNOSIS — N62 MACROMASTIA: ICD-10-CM

## 2025-04-23 DIAGNOSIS — Z00.00 ROUTINE GENERAL MEDICAL EXAMINATION AT A HEALTH CARE FACILITY: Primary | ICD-10-CM

## 2025-04-23 DIAGNOSIS — E66.3 OVERWEIGHT (BMI 25.0-29.9): ICD-10-CM

## 2025-04-23 DIAGNOSIS — Z78.0 POST-MENOPAUSAL: ICD-10-CM

## 2025-04-23 DIAGNOSIS — I10 PRIMARY HYPERTENSION: ICD-10-CM

## 2025-04-23 DIAGNOSIS — E78.2 MIXED HYPERLIPIDEMIA: ICD-10-CM

## 2025-04-23 DIAGNOSIS — Z13.1 SCREENING FOR DIABETES MELLITUS: ICD-10-CM

## 2025-04-23 LAB
ANION GAP SERPL CALCULATED.3IONS-SCNC: 14 MMOL/L (ref 7–15)
BUN SERPL-MCNC: 15.2 MG/DL (ref 8–23)
CALCIUM SERPL-MCNC: 10.2 MG/DL (ref 8.8–10.4)
CHLORIDE SERPL-SCNC: 103 MMOL/L (ref 98–107)
CHOLEST SERPL-MCNC: 215 MG/DL
CREAT SERPL-MCNC: 0.89 MG/DL (ref 0.51–0.95)
EGFRCR SERPLBLD CKD-EPI 2021: 71 ML/MIN/1.73M2
EST. AVERAGE GLUCOSE BLD GHB EST-MCNC: 103 MG/DL
FASTING STATUS PATIENT QL REPORTED: YES
FASTING STATUS PATIENT QL REPORTED: YES
GLUCOSE SERPL-MCNC: 87 MG/DL (ref 70–99)
HBA1C MFR BLD: 5.2 % (ref 0–5.6)
HCO3 SERPL-SCNC: 24 MMOL/L (ref 22–29)
HDLC SERPL-MCNC: 66 MG/DL
LDLC SERPL CALC-MCNC: 96 MG/DL
NONHDLC SERPL-MCNC: 149 MG/DL
POTASSIUM SERPL-SCNC: 4.6 MMOL/L (ref 3.4–5.3)
SODIUM SERPL-SCNC: 141 MMOL/L (ref 135–145)
TRIGL SERPL-MCNC: 264 MG/DL

## 2025-04-23 PROCEDURE — 1126F AMNT PAIN NOTED NONE PRSNT: CPT | Performed by: NURSE PRACTITIONER

## 2025-04-23 PROCEDURE — 99214 OFFICE O/P EST MOD 30 MIN: CPT | Mod: 25 | Performed by: NURSE PRACTITIONER

## 2025-04-23 PROCEDURE — G2211 COMPLEX E/M VISIT ADD ON: HCPCS | Performed by: NURSE PRACTITIONER

## 2025-04-23 PROCEDURE — 3074F SYST BP LT 130 MM HG: CPT | Performed by: NURSE PRACTITIONER

## 2025-04-23 PROCEDURE — 36415 COLL VENOUS BLD VENIPUNCTURE: CPT | Performed by: NURSE PRACTITIONER

## 2025-04-23 PROCEDURE — 83036 HEMOGLOBIN GLYCOSYLATED A1C: CPT | Performed by: NURSE PRACTITIONER

## 2025-04-23 PROCEDURE — 91320 SARSCV2 VAC 30MCG TRS-SUC IM: CPT | Performed by: NURSE PRACTITIONER

## 2025-04-23 PROCEDURE — 3079F DIAST BP 80-89 MM HG: CPT | Performed by: NURSE PRACTITIONER

## 2025-04-23 PROCEDURE — 99397 PER PM REEVAL EST PAT 65+ YR: CPT | Performed by: NURSE PRACTITIONER

## 2025-04-23 PROCEDURE — 90480 ADMN SARSCOV2 VAC 1/ONLY CMP: CPT | Performed by: NURSE PRACTITIONER

## 2025-04-23 PROCEDURE — 80048 BASIC METABOLIC PNL TOTAL CA: CPT | Performed by: NURSE PRACTITIONER

## 2025-04-23 PROCEDURE — 80061 LIPID PANEL: CPT | Performed by: NURSE PRACTITIONER

## 2025-04-23 RX ORDER — ATORVASTATIN CALCIUM 80 MG/1
80 TABLET, FILM COATED ORAL DAILY
Qty: 90 TABLET | Refills: 3 | Status: SHIPPED | OUTPATIENT
Start: 2025-04-23

## 2025-04-23 RX ORDER — LISINOPRIL 20 MG/1
20 TABLET ORAL DAILY
Qty: 90 TABLET | Refills: 3 | Status: SHIPPED | OUTPATIENT
Start: 2025-04-23

## 2025-04-23 SDOH — HEALTH STABILITY: PHYSICAL HEALTH: ON AVERAGE, HOW MANY DAYS PER WEEK DO YOU ENGAGE IN MODERATE TO STRENUOUS EXERCISE (LIKE A BRISK WALK)?: 3 DAYS

## 2025-04-23 ASSESSMENT — PAIN SCALES - GENERAL: PAINLEVEL_OUTOF10: NO PAIN (0)

## 2025-04-23 ASSESSMENT — SOCIAL DETERMINANTS OF HEALTH (SDOH): HOW OFTEN DO YOU GET TOGETHER WITH FRIENDS OR RELATIVES?: TWICE A WEEK

## 2025-04-23 NOTE — PROGRESS NOTES
Preventive Care Visit  [facility]  Anusha Fermin NP, Internal Medicine  Apr 23, 2025  {Provider  Link to SmartSet :686335}    {PROVIDER CHARTING PREFERENCE:235327}    Jennifer Hough is a 64 year old, presenting for the following:  Annual Visit        4/23/2025     7:36 AM   Additional Questions   Roomed by  Jacqueline Paw   Accompanied by Self          HPI  ***   {MA/LPN/RN Pre-Provider Visit Orders- hCG/UA/Strep (Optional):129884}  {SUPERLIST (Optional):389139}  {additonal problems for provider to add (Optional):015962}  Advance Care Planning  {The storyboard will display whether the patient has ACP docs on file. Hover over the Code section in the storyboard to access the ACP documents. :006819}  Discussed advance care planning with patient; informed AVS has link to Honoring Choices.        4/23/2025   General Health   How would you rate your overall physical health? Excellent   Feel stress (tense, anxious, or unable to sleep) Only a little   (!) STRESS CONCERN      4/23/2025   Nutrition   Diet: Regular (no restrictions)         4/23/2025   Exercise   Days per week of moderate/strenous exercise 3 days         4/23/2025   Social Factors   Frequency of gathering with friends or relatives Twice a week   Worry food won't last until get money to buy more No   Food not last or not have enough money for food? No   Do you have housing? (Housing is defined as stable permanent housing and does not include staying ouside in a car, in a tent, in an abandoned building, in an overnight shelter, or couch-surfing.) Yes   Are you worried about losing your housing? No   Lack of transportation? No   Unable to get utilities (heat,electricity)? No         4/23/2025   Fall Risk   Fallen 2 or more times in the past year? No   Trouble with walking or balance? No          4/23/2025   Activities of Daily Living- Home Safety   Needs help with the following daily activites None of the above   Safety concerns in the home  None of the above         2025   Dental   Dentist two times every year? Yes         2025   Hearing Screening   Hearing concerns? None of the above         2025   Driving Risk Screening   Patient/family members have concerns about driving No         2025   General Alertness/Fatigue Screening   Have you been more tired than usual lately? No         2025   Urinary Incontinence Screening   Bothered by leaking urine in past 6 months No         Today's PHQ-2 Score:       2025     7:31 AM   PHQ-2 (  [employer])   Q1: Little interest or pleasure in doing things 0   Q2: Feeling down, depressed or hopeless 0   PHQ-2 Score 0    Q1: Little interest or pleasure in doing things Not at all   Q2: Feeling down, depressed or hopeless Not at all   PHQ-2 Score 0       Patient-reported           2025   Substance Use   Alcohol more than 3/day or more than 7/wk No   Do you have a current opioid prescription? No   How severe/bad is pain from 1 to 10? 0/10 (No Pain)   Do you use any other substances recreationally? No     Social History     Tobacco Use    Smoking status: Former     Current packs/day: 0.00     Average packs/day: 0.3 packs/day for 30.0 years (7.5 ttl pk-yrs)     Types: Cigarettes     Start date: 1979     Quit date: 2009     Years since quittin.2    Smokeless tobacco: Never   Vaping Use    Vaping status: Never Used   Substance Use Topics    Alcohol use: Not Currently     Alcohol/week: 7.0 standard drinks of alcohol     Types: 7 Standard drinks or equivalent per week     Comment: Alcoholic Drinks/day: socially on weekend    Drug use: No     {Provider  If there are gaps in the social history shown above, please follow the link to update and then refresh the note Link to Social and Substance History :197942}      2024   LAST FHS-7 RESULTS   1st degree relative breast or ovarian cancer No   Any relative bilateral breast cancer No   Any male have breast cancer No   Any ONE  woman have BOTH breast AND ovarian cancer No   Any woman with breast cancer before 50yrs No   2 or more relatives with breast AND/OR ovarian cancer No   2 or more relatives with breast AND/OR bowel cancer No     {If any of the questions to the FHS7 are answered yes, consider referral for genetic counseling.    Additional indications for genetic referral include personal history of breast or ovarian cancer, genetic mutation in 1st degree relative which increases risk of breast cancer including BRCA1, BRCA2, CALLIE, PALB 2, TP53, CHEK2, PTEN, CDH1, STK11 (per [facility]) and/or 1st degree relative with history of pancreatic or high-risk prostate cancer (per NCCN):693682}   {Mammogram Decision Support (Optional):718396}      History of abnormal Pap smear: { :883640}        Latest Ref Rng & Units 10/10/2023     1:44 PM 3/21/2017     3:29 PM   PAP / HPV   PAP  Negative for Intraepithelial Lesion or Malignancy (NILM)  Negative for squamous intraepithelial lesion or malignancy  Electronically signed by Charity Garg CT (ASCP) on 3/27/2017 at  3:26 PM      HPV 16 DNA Negative Negative     HPV 18 DNA Negative Negative     Other HR HPV Negative Negative       ASCVD Risk   The 10-year ASCVD risk score (Dilcia JOSHUA, et al., 2019) is: 7.3%    Values used to calculate the score:      Age: 64 years      Sex: Female      Is Non- : No      Diabetic: No      Tobacco smoker: No      Systolic Blood Pressure: 122 mmHg      Is BP treated: Yes      HDL Cholesterol: 55 mg/dL      Total Cholesterol: 185 mg/dL    {Link to Fracture Risk Assessment Tool (Optional):774685}    {Provider  REQUIRED FOR AWV Use the storyboard to review patient history, after sections have been marked as reviewed, refresh note to capture documentation:959676}  {Provider   REQUIRED AWV use this link to review and update sexual activity history  after section has been marked as reviewed, refresh note to capture  "documentation:094503}  Reviewed and updated as needed this visit by Provider                    {HISTORY OPTIONS (Optional):071929}  Current providers sharing in care for this patient include:  Patient Care Team:  Anusha Fermin NP as PCP - General  Anusha Fermin NP as Referring Physician  Anusha Fermin NP as Assigned PCP  ALINA Geronimo MD as MD (Plastic Surgery)  ALINA Geronimo MD as Assigned Surgical Provider    The following health maintenance items are reviewed in Epic and correct as of today:  Health Maintenance   Topic Date Due    DEXA  Never done    MEDICARE ANNUAL WELLNESS VISIT  10/10/2024    ANNUAL REVIEW OF HM ORDERS  10/10/2024    BMP  02/12/2025    LIPID  02/12/2025    COVID-19 Vaccine (7 - 2024-25 season) 03/25/2025    FALL RISK ASSESSMENT  04/23/2026    COLORECTAL CANCER SCREENING  06/30/2026    MAMMO SCREENING  12/11/2026    DIABETES SCREENING  02/12/2027    ADVANCE CARE PLANNING  02/12/2029    DTAP/TDAP/TD IMMUNIZATION (3 - Td or Tdap) 06/16/2032    RSV VACCINE (1 - 1-dose 75+ series) 11/08/2033    PHQ-2 (once per calendar year)  Completed    INFLUENZA VACCINE  Completed    Pneumococcal Vaccine: 50+ Years  Completed    ZOSTER IMMUNIZATION  Completed    HPV IMMUNIZATION  Aged Out    MENINGITIS IMMUNIZATION  Aged Out    HEPATITIS C SCREENING  Discontinued    PAP  Discontinued    LUNG CANCER SCREENING  Discontinued       {ROS Picklists (Optional):757866}     Objective    Exam  /84   Pulse 74   Temp 97.8  F (36.6  C) (Oral)   Resp 12   Ht 1.63 m (5' 4.17\")   Wt 71.9 kg (158 lb 8 oz)   LMP  (LMP Unknown)   SpO2 97%   BMI 27.06 kg/m     Estimated body mass index is 27.06 kg/m  as calculated from the following:    Height as of this encounter: 1.63 m (5' 4.17\").    Weight as of this encounter: 71.9 kg (158 lb 8 oz).    Physical Exam  {Exam Choices (Optional):240319}        4/23/2025   Mini Cog   Clock Draw Score 2 Normal   3 Item Recall 3 objects recalled   Mini Cog Total Score 5 "     {A Mini-Cog total score of 0-2 suggests the possibility of dementia, score of 3-5 suggests no dementia:729148}    Vision Screen  Patient wears corrective lenses (select all that apply): Worn during vision screen  Vision Screen Results: Pass  {Provider  Link to Vision and Hearing Results :992761}    Signed Electronically by: Anusha Fermin NP  {Email feedback regarding this note to [email or web address]   :104810}

## 2025-04-23 NOTE — PROGRESS NOTES
Internal Medicine Preventive Care Visit  [address]  [place] [address]  Phone: [phone]  Fax: [phone]          Patient Name: Monse Park  Patient Age: 64 year old  YOB: 1960  MRN: [id number]    Date of Visit: 4/23/2025  Primary Provider: Anusha Fermin   Patient presents with:  Annual Visit         4/23/2025     7:36 AM   Additional Questions   Roomed by Moses Solano   Accompanied by Self     HPI:  France Park, age 64, female   - Had a reduction mammoplasty procedure with no pain post-operatively, but experienced fluid accumulation requiring drainage. She is generally very glad she went through with the surgery.   - Reports skin tags that catch on jewelry and clothing, causing irritation.   - Experienced dizziness almost two years ago attributed to crystals in the ear. Occasional dizziness when changing positions, but no headaches or balance issues.   - BMI of 27, interested in weight loss options.   - Reports gaining 10 lbs over the winter due to reduced activity.   - Blood pressure medication adjustment in the fall of last year.    Health Care Directive  Patient does not have a Health Care Directive: Discussed advance care planning with patient; information given to patient to review.        4/23/2025   Social Factors   Frequency of gathering with friends or relatives Twice a week   Worry food won't last until get money to buy more No   Food not last or not have enough money for food? No   Do you have housing? (Housing is defined as stable permanent housing and does not include staying ouside in a car, in a tent, in an abandoned building, in an overnight shelter, or couch-surfing.) Yes   Are you worried about losing your housing? No   Lack of transportation? No   Unable to get utilities (heat,electricity)? No         4/23/2025   Fall Risk   Fallen 2 or more times in the past year? No   Trouble with  walking or balance? No          2025   Activities of Daily Living- Home Safety   Needs help with the following daily activites None of the above   Safety concerns in the home None of the above         2025   Dental   Dentist two times every year? Yes         2025   Hearing Screening   Hearing concerns? None of the above         2025   Driving Risk Screening   Patient/family members have concerns about driving No         2025   General Alertness/Fatigue Screening   Have you been more tired than usual lately? No         2025   Urinary Incontinence Screening   Bothered by leaking urine in past 6 months No         Today's PHQ-2 Score:       2025     7:31 AM   PHQ-2 (  [employer])   Q1: Little interest or pleasure in doing things 0   Q2: Feeling down, depressed or hopeless 0   PHQ-2 Score 0    Q1: Little interest or pleasure in doing things Not at all   Q2: Feeling down, depressed or hopeless Not at all   PHQ-2 Score 0       Patient-reported           2025   Substance Use   Alcohol more than 3/day or more than 7/wk No   Do you have a current opioid prescription? No   How severe/bad is pain from 1 to 10? 0/10 (No Pain)   Do you use any other substances recreationally? No     Social History     Tobacco Use    Smoking status: Former     Current packs/day: 0.00     Average packs/day: 0.3 packs/day for 30.0 years (7.5 ttl pk-yrs)     Types: Cigarettes     Start date: 1979     Quit date: 2009     Years since quittin.2    Smokeless tobacco: Never   Vaping Use    Vaping status: Never Used   Substance Use Topics    Alcohol use: Not Currently     Alcohol/week: 7.0 standard drinks of alcohol     Types: 7 Standard drinks or equivalent per week     Comment: Alcoholic Drinks/day: socially on weekend    Drug use: No             2024   LAST FHS-7 RESULTS   1st degree relative breast or ovarian cancer No   Any relative bilateral breast cancer No   Any male have breast cancer No    Any ONE woman have BOTH breast AND ovarian cancer No   Any woman with breast cancer before 50yrs No   2 or more relatives with breast AND/OR ovarian cancer No   2 or more relatives with breast AND/OR bowel cancer No             Latest Ref Rng & Units 10/10/2023     1:44 PM 3/21/2017     3:29 PM   PAP / HPV   PAP  Negative for Intraepithelial Lesion or Malignancy (NILM)  Negative for squamous intraepithelial lesion or malignancy  Electronically signed by Charity Garg CT (ASCP) on 3/27/2017 at  3:26 PM      HPV 16 DNA Negative Negative     HPV 18 DNA Negative Negative     Other HR HPV Negative Negative       ASCVD Risk   The 10-year ASCVD risk score (Dilcia JOSHUA, et al., 2019) is: 7.3%    Values used to calculate the score:      Age: 64 years      Sex: Female      Is Non- : No      Diabetic: No      Tobacco smoker: No      Systolic Blood Pressure: 122 mmHg      Is BP treated: Yes      HDL Cholesterol: 55 mg/dL      Total Cholesterol: 185 mg/dL      Patient Active Problem List   Diagnosis    Mixed hyperlipidemia    Primary hypertension    Impaired fasting glucose    Benign neoplasm of descending colon    Diverticular disease of large intestine    History of colonic polyps    Macromastia    Cervical cancer screening     Current Scheduled Meds:  Current Outpatient Medications   Medication Sig Dispense Refill    atorvastatin (LIPITOR) 80 MG tablet Take 1 tablet (80 mg) by mouth daily. 90 tablet 3    cholecalciferol (VITAMIN D3) 25 mcg (1000 units) capsule Take 1 capsule by mouth daily      lisinopril (ZESTRIL) 20 MG tablet Take 1 tablet (20 mg) by mouth daily. 90 tablet 3    magnesium 250 MG tablet Take 1 tablet by mouth daily      Multiple Vitamins-Minerals (HAIR SKIN & NAILS) TABS Take 3 tablets by mouth daily      semaglutide-weight management (WEGOVY) 0.25 MG/0.5ML pen Inject 0.5 mLs (0.25 mg) subcutaneously once a week. 2 mL 0    zinc gluconate 50 MG tablet Take 50 mg by  "mouth daily      ondansetron (ZOFRAN ODT) 4 MG ODT tab Take 1 tablet (4 mg) by mouth every 8 hours as needed for nausea (Patient not taking: Reported on 4/23/2025) 4 tablet 0    senna-docusate (SENOKOT-S/PERICOLACE) 8.6-50 MG tablet Take 1-2 tablets by mouth 2 times daily (Patient not taking: Reported on 4/23/2025) 30 tablet 0         Objective   Physical Examination:  Vitals:    04/23/25 0738   BP: 122/84   Pulse: 74   Resp: 12   Temp: 97.8  F (36.6  C)   TempSrc: Oral   SpO2: 97%   Weight: 71.9 kg (158 lb 8 oz)   Height: 1.63 m (5' 4.17\")     Wt Readings from Last 5 Encounters:   04/23/25 71.9 kg (158 lb 8 oz)   04/16/24 67.7 kg (149 lb 4.8 oz)   04/12/24 68.5 kg (151 lb 1.6 oz)   03/08/24 70.3 kg (155 lb)   02/12/24 71 kg (156 lb 8 oz)     Body mass index is 27.06 kg/m .     Constitutional: In no apparent distress  Eyes: Conjunctivae clear. Non-icteric.   ENT: Tympanic membrane clear with landmarks well visualized bilaterally. Lips and mucosa moist. Pharynx without erythema or exudate. Neck is supple, trachea midline. No cervical adenopathy  Respiratory: Clear to auscultation bilaterally. Normal inspiratory and expiratory effort  Cardiovascular: Regular rate and rhythm. No murmurs, rubs, or gallops. No edema  Gastrointestinal: Bowel sounds active all four quadrants. Soft, non-tender.   Neuro: Normal gait  Psych: Alert and oriented x3.     Diagnoses managed today:  1. Routine general medical examination at a health care facility    2. Primary hypertension    3. Mixed hyperlipidemia    4. Macromastia    5. Skin tag    6. Overweight (BMI 25.0-29.9)    7. Screening for diabetes mellitus    8. Post-menopausal         Assessment & Plan     Encounter for Medicare annual wellness exam:  - Order bone density test. Order cholesterol profile, blood sugar, kidney function profile, and A1c for diabetes screening. Reviewed personalized preventive plan and provided AVS with overview.     Primary hypertension:  - Blood pressure " appears well-controlled with current medication regimen.  - Continue current medication regimen.    Mixed hyperlipidemia:  - Order cholesterol profile.    Macromastia:  - s/p reduction mammoplasty     Skin tag:  - Schedule a separate appointment for removal of skin tags.    Overweight (BMI 25.0-29.9) with weight related comorbidity:  - BMI is 27.0 with weight related co-morbidities including HTN, HLD meeting criteria for weight loss medication consideration. She has been trying lifestyle measures including increasing exercise as the weather has warmed and reducing caloric intake.   - Start Wegovy for weight loss. Emphasize importance of lifestyle changes, including regular exercise and a diet rich in protein, fruits, and vegetables. Schedule follow-up in 4-6 weeks to assess progress and side effects.  - Risks and side effects: Discussed potential side effects of Wegovy, including nausea, especially in the first few weeks and with dose increases.    Screening for diabetes mellitus:  - Order A1c for diabetes screening.    Post-menopausal:  - DEXA ordered         I have discontinued France Park's oxyCODONE. I have also changed her lisinopril and atorvastatin. Additionally, I am having her start on semaglutide-weight management. Lastly, I am having her maintain her magnesium, cholecalciferol, zinc gluconate, Hair Skin & Nails, ondansetron, and senna-docusate.        Orders Placed This Encounter   Procedures    DEXA HIP/PELVIS/SPINE - Future    COVID-19 12+ (PFIZER)    BASIC METABOLIC PANEL    Lipid panel reflex to direct LDL Non-fasting    Hemoglobin A1c       Follow up: No follow-ups on file. Sooner if needed.    The longitudinal plan of care for the diagnosis(es)/condition(s) as documented were addressed during this visit. Due to the added complexity in care, I will continue to support France in the subsequent management and with ongoing continuity of care.    Anusha Fermin, DNP, APRN, CNP   Electronically signed

## 2025-04-23 NOTE — TELEPHONE ENCOUNTER
Patient:   VALERIE PAULSON            MRN: [id number]            FIN: [id number]               Age:   52 years     Sex:  FEMALE     :  60   Associated Diagnoses:   None   Author:   WOJCIECH KLEIN        Admit Date: 3/10/17    Discharge Date: 3/11/17    Attending Physician: Marlen    Primary Care Physician: Bronson    Reason for admission:  Chest pain  (see H&P for further detail)    Discharge condition: improved    Disposition: Home    Important follow up:   -PCP [ ] within 7 days [  X] within 14 days [ ] other:         Discharge meds:    DISCHARGE MEDICATION LIST   Allergies: Seasonal     MEDICATION  DOSE  ROUTE  FREQUENCY  SPECIAL INSTRUCTIONS   cholecalciferol (cholecalciferol (vitamin D3) oral 1,000 unit tablet)  2,000 unit=2 tab  Oral  Daily     cholecalciferol (cholecalciferol 2000 intl units oral tablet)  2,000 unit=1 tab  Oral  Daily     levothyroxine (levothyroxine 50 mcg (0.05 mg) oral tablet)  50 mcg=1 tab  Oral  Daily     methylphenidate (Concerta oral 27 mg 24-hour CR tablet)  27 mg=1 tab  Oral  Every morning     multivitamin (multivitamin oral tablet)  1 tab  Oral  Daily     omega-3 polyunsaturated fatty acids (Fish Oil EPA oral capsule)        - 1 capsule daily   pantoprazole (Protonix oral 40 mg DR tablet)  40 mg=1 tab  Oral  Daily, before morning meal  **Pres[id number]       Discharge diagnoses:    Chest pain, non cardiac  Suspect GERD  Hypothyroidism  ADD    Consults:   Jennifer - Dr Felix    Radiology:   Stress  IMPRESSION:  1. Normal SPECT Myocardial perfusion imaging.  2. Normal left ventricular systolic function.  The calculated left ventricular ejection fraction is 71 % at stress and 66 % at rest.  3. Normal  ECG response to exercise.  4. Average  functional capacity.  5. Appropriate  blood pressure response to exercise.      Result title:  XR CHEST PORTABLE 1V  Result status:  Final  Verified by:  CARLOS MANUEL,  Retail Pharmacy Prior Authorization Team  Phone: [phone]    PRIOR AUTHORIZATION DENIED    Medication: WEGOVY 0.25 MG/0.5ML SC SOAJ  Insurance Company: [employer] - Phone [phone] Fax [phone]  Denial Date: 4/23/2025  Denial Reason(s):     BENEFIT EXCLUSION- NOT ELIGIBLE FOR PA    Appeal Information: NONE  Patient Notified: NO- Unfortunately, we cannot call the patient with denials because we do not know what next steps the MD will take nor can we give medical advice, please notify the patient of what they are to expect for the continuation of their therapy from the provider.        NINI on 03/10/2017 6:28  IMPRESSION:1.  Low lung volumes.2.  Slight cardiac enlargement with secondary signs of possible minimal fluid overload.             History of Present Illness             The patient presents with   52F with no significant PMHx, was driving [place] today with her  and had sudden onset of substernal chest pain and pressure. Very intense pain, stopped the car to walk around and stretch, but no relief. Continued driving and had to roll the windows down to catch her breath, not sure if she was SOB or panicking. Persisted so her  convinced her to come to the ER.   Pain is now much imrpoved, just residual mild heaviness. No cardiac hx. No tobacco, mild etOH. No drugs.     Hospital course:   1. Chest pain:   trops negative, stress negative  suspect GERD, started PPI on dc, trial x 1mo.    2. Hypothyroidism:   continue synthroid  check fT4    3. ADD:      continue home meds.     Physical exam:    Vitals between:   21-JAN-2015 12:46:36   TO   22-JAN-2015 12:46:36                   LAST RESULT MINIMUM MAXIMUM  Temperature 35.9 35.7 36.6  Heart Rate 74 57 78  Respiratory Rate 18 16 20  NISBP           142 142 174  NIDBP           80 76 98  NIMBP           101 101 123  SpO2                    97 96 98  General: Alert and oriented, No acute distress.  HENT: Normocephalic, Oral mucosa is moist.  Neck: Supple, Non-tender, No lymphadenopathy.  Respiratory: Lungs are clear to auscultation, Respirations are non-labored, Breath sounds are equal.  Cardiovascular: Normal rate, Regular rhythm, No murmur, No edema.  Arterial pulses: Bilateral, Dorsalis pedis, 1+.  Gastrointestinal: Soft, Non-tender, Non-distended, Normal bowel sounds, No organomegaly.  Musculoskeletal: Normal range of motion, Normal strength.  Integumentary: warm, clean, dry, no rash  Neurologic: Alert, Oriented, No focal deficits, Cranial Nerves II-XII are grossly intact.  Psychiatric: Cooperative, Appropriate mood &  affect.    Total time coordinating care > 30 mins.    Patient had opportunity to ask questions and stated understanding and agreed with therapeutic plan as outlined.                Electronically Signed On 03/12/2017 18:12  __________________________________________________   WOJCIECH KLEIN

## 2025-04-23 NOTE — PATIENT INSTRUCTIONS
Patient Education   Preventive Care Advice   This is general advice given by our system to help you stay healthy. However, your care team may have specific advice just for you. Please talk to your care team about your preventive care needs.  Nutrition  Eat 5 or more servings of fruits and vegetables each day.  Try wheat bread, brown rice and whole grain pasta (instead of white bread, rice, and pasta).  Get enough calcium and vitamin D. Check the label on foods and aim for 100% of the RDA (recommended daily allowance).  Lifestyle  Exercise at least 150 minutes each week  (30 minutes a day, 5 days a week).  Do muscle strengthening activities 2 days a week. These help control your weight and prevent disease.  No smoking.  Wear sunscreen to prevent skin cancer.  Have a dental exam and cleaning every 6 months.  Yearly exams  See your health care team every year to talk about:  Any changes in your health.  Any medicines your care team has prescribed.  Preventive care, family planning, and ways to prevent chronic diseases.  Shots (vaccines)   HPV shots (up to age 26), if you've never had them before.  Hepatitis B shots (up to age 59), if you've never had them before.  COVID-19 shot: Get this shot when it's due.  Flu shot: Get a flu shot every year.  Tetanus shot: Get a tetanus shot every 10 years.  Pneumococcal, hepatitis A, and RSV shots: Ask your care team if you need these based on your risk.  Shingles shot (for age 50 and up)  General health tests  Diabetes screening:  Starting at age 35, Get screened for diabetes at least every 3 years.  If you are younger than age 35, ask your care team if you should be screened for diabetes.  Cholesterol test: At age 39, start having a cholesterol test every 5 years, or more often if advised.  Bone density scan (DEXA): At age 50, ask your care team if you should have this scan for osteoporosis (brittle bones).  Hepatitis C: Get tested at least once in your life.  STIs (sexually  transmitted infections)  Before age 24: Ask your care team if you should be screened for STIs.  After age 24: Get screened for STIs if you're at risk. You are at risk for STIs (including HIV) if:  You are sexually active with more than one person.  You don't use condoms every time.  You or a partner was diagnosed with a sexually transmitted infection.  If you are at risk for HIV, ask about PrEP medicine to prevent HIV.  Get tested for HIV at least once in your life, whether you are at risk for HIV or not.  Cancer screening tests  Cervical cancer screening: If you have a cervix, begin getting regular cervical cancer screening tests starting at age 21.  Breast cancer scan (mammogram): If you've ever had breasts, begin having regular mammograms starting at age 40. This is a scan to check for breast cancer.  Colon cancer screening: It is important to start screening for colon cancer at age 45.  Have a colonoscopy test every 10 years (or more often if you're at risk) Or, ask your provider about stool tests like a FIT test every year or Cologuard test every 3 years.  To learn more about your testing options, visit:   .  For help making a decision, visit:   https://bit.ly/id75780.  Prostate cancer screening test: If you have a prostate, ask your care team if a prostate cancer screening test (PSA) at age 55 is right for you.  Lung cancer screening: If you are a current or former smoker ages 50 to 80, ask your care team if ongoing lung cancer screenings are right for you.  For informational purposes only. Not to replace the advice of your health care provider. Copyright   2023 [employer] All rights reserved. Clinically reviewed by the [facility]. [employer] [id number] - REV 01/24.

## 2025-04-24 NOTE — TELEPHONE ENCOUNTER
Please call patient: The Wegovy is not covered by her insurance.  It does not appear that other medications in this drug class are covered for weight loss

## 2025-04-25 ENCOUNTER — MYC MEDICAL ADVICE (OUTPATIENT)
Dept: INTERNAL MEDICINE | Facility: CLINIC | Age: 65
End: 2025-04-25
Payer: MEDICARE

## 2025-04-29 NOTE — TELEPHONE ENCOUNTER
Please schedule her for a virtual visit to review alternative options. There are side effects to take into account and some medications are not a good option for her based on her medical history so I want to be sure that she is comfortable with possible side effects associated with other medication options. An alternative option is that I could place a referral to a dietician to discuss dietary options for assisting with weight loss.

## 2025-05-22 ENCOUNTER — OFFICE VISIT (OUTPATIENT)
Dept: INTERNAL MEDICINE | Facility: CLINIC | Age: 65
End: 2025-05-22
Payer: MEDICARE

## 2025-05-22 VITALS
HEIGHT: 64 IN | SYSTOLIC BLOOD PRESSURE: 132 MMHG | DIASTOLIC BLOOD PRESSURE: 80 MMHG | BODY MASS INDEX: 27.16 KG/M2 | OXYGEN SATURATION: 97 % | RESPIRATION RATE: 20 BRPM | WEIGHT: 159.1 LBS | TEMPERATURE: 98 F | HEART RATE: 77 BPM

## 2025-05-22 DIAGNOSIS — E66.3 OVERWEIGHT (BMI 25.0-29.9): Primary | ICD-10-CM

## 2025-05-22 DIAGNOSIS — L91.8 SKIN TAG: ICD-10-CM

## 2025-05-22 DIAGNOSIS — H90.3 BILATERAL SENSORINEURAL HEARING LOSS: ICD-10-CM

## 2025-05-22 RX ORDER — NALTREXONE HYDROCHLORIDE 50 MG/1
25 TABLET, FILM COATED ORAL DAILY
Qty: 45 TABLET | Refills: 0 | Status: SHIPPED | OUTPATIENT
Start: 2025-05-22

## 2025-05-22 RX ORDER — BUPROPION HYDROCHLORIDE 150 MG/1
150 TABLET ORAL EVERY MORNING
Qty: 90 TABLET | Refills: 0 | Status: SHIPPED | OUTPATIENT
Start: 2025-05-22

## 2025-05-22 NOTE — PROGRESS NOTES
"  {PROVIDER CHARTING PREFERENCE:883499}    Jennifer Hough is a 64 year old, presenting for the following health issues:  Skin Tags      5/22/2025     9:16 AM   Additional Questions   Roomed by Shikha CHESTER CMA     History of Present Illness       Reason for visit:  Remove skin tags    She eats 2-3 servings of fruits and vegetables daily.She consumes 0 sweetened beverage(s) daily.She exercises with enough effort to increase her heart rate 20 to 29 minutes per day.  She exercises with enough effort to increase her heart rate 4 days per week.   She is taking medications regularly.        {MA/LPN/RN Pre-Provider Visit Orders- hCG/UA/Strep (Optional):818362}  {SUPERLIST (Optional):282534}  {additonal problems for provider to add (Optional):675458}    {ROS Picklists (Optional):745807}      Objective    /80 (BP Location: Right arm, Patient Position: Sitting, Cuff Size: Adult Regular)   Pulse 77   Temp 98  F (36.7  C) (Oral)   Resp 20   Ht 1.63 m (5' 4.17\")   Wt 72.2 kg (159 lb 1.6 oz)   LMP  (LMP Unknown)   SpO2 97%   BMI 27.16 kg/m    Body mass index is 27.16 kg/m .  Physical Exam   {Exam List (Optional):257480}    {Diagnostic Test Results (Optional):047124}        Signed Electronically by: Anusha Fermin NP  {Email feedback regarding this note to [email or web address]   :256545}  "

## 2025-05-22 NOTE — PROGRESS NOTES
Internal Medicine Office Visit  [address] [place] [id number]  Phone: [phone]  Fax: [phone]        Patient Name: Monse Park  Patient Age: 64 year old  YOB: 1960  MRN: [id number]    Date of Visit: 5/22/2025  Primary Provider: Anusha Fermin    Subjective   Patient presents with:  Skin Tags         5/22/2025     9:16 AM   Additional Questions   Roomed by Shikha CHESTER CMA     HPI:  France Park, 64 years old, female  - Skin tags present, seeking removal as they catch on her clothing and jewelry and get irritated.   - Dizziness primarily upon waking in the morning, aware since last visit  - Ringing in the left ear, constant, not strong enough to be bothersome  - No trouble with hearing or noticeable hearing loss  - Previous experience with dizziness, once thought to be a heart attack or stroke  - Work from home environment contributing to weight management challenges  - Attempts to lose weight without success, snacking behavior noted  - No previous use of bupropion or naltrexone for weight management        Patient Active Problem List   Diagnosis    Mixed hyperlipidemia    Primary hypertension    Impaired fasting glucose    Benign neoplasm of descending colon    Diverticular disease of large intestine    History of colonic polyps    Macromastia    Cervical cancer screening    Overweight (BMI 25.0-29.9)     Current Scheduled Meds:  Current Outpatient Medications   Medication Sig Dispense Refill    atorvastatin (LIPITOR) 80 MG tablet Take 1 tablet (80 mg) by mouth daily. 90 tablet 3    buPROPion (WELLBUTRIN XL) 150 MG 24 hr tablet Take 1 tablet (150 mg) by mouth every morning. 90 tablet 0    cholecalciferol (VITAMIN D3) 25 mcg (1000 units) capsule Take 1 capsule by mouth daily      lisinopril (ZESTRIL) 20 MG tablet Take 1 tablet (20 mg) by mouth daily. 90 tablet 3    magnesium 250 MG tablet Take 1 tablet by mouth daily    "   Multiple Vitamins-Minerals (HAIR SKIN & NAILS) TABS Take 3 tablets by mouth daily      naltrexone (DEPADE/REVIA) 50 MG tablet Take 0.5 tablets (25 mg) by mouth daily. 45 tablet 0    zinc gluconate 50 MG tablet Take 50 mg by mouth daily      senna-docusate (SENOKOT-S/PERICOLACE) 8.6-50 MG tablet Take 1-2 tablets by mouth 2 times daily (Patient not taking: Reported on 2025) 30 tablet 0         Objective   Physical Examination:  Vitals:    25 0917   BP: 132/80   BP Location: Right arm   Patient Position: Sitting   Cuff Size: Adult Regular   Pulse: 77   Resp: 20   Temp: 98  F (36.7  C)   TempSrc: Oral   SpO2: 97%   Weight: 72.2 kg (159 lb 1.6 oz)   Height: 1.63 m (5' 4.17\")     Wt Readings from Last 5 Encounters:   25 72.2 kg (159 lb 1.6 oz)   25 71.9 kg (158 lb 8 oz)   24 67.7 kg (149 lb 4.8 oz)   24 68.5 kg (151 lb 1.6 oz)   24 70.3 kg (155 lb)     Body mass index is 27.16 kg/m .     Constitutional: In no apparent distress  SKIN TA Small brown/skin colored excrescences attached by short broad to narrow stalk(s) consistent with benign skin tags on the neck     Procedure:  7 skin tags, all around the neck.  An alcohol wipe was used to clean the skin. Skin tags x 4 were removed with iris scissors. 3 lesions were treated with cryotherapy in a freeze-thaw-freeze cycle.       Diagnoses managed today:  1. Overweight (BMI 25.0-29.9)    2. Bilateral sensorineural hearing loss    3. Skin tag         Assessment & Plan     Overweight (BMI 25.0-29.9)  - Overweight likely related to snacking habits and working from home behavior (she is now retired). Bupropion and naltrexone combination may help with emotional eating and appetite suppression. Wegovy was not covered by her insurance.   - Prescribe bupropion and naltrexone combination. Monitor blood pressure 2 weeks after starting medication. Follow-up in 3 months for weight check.    Bilateral sensorineural hearing loss  - Potential " hearing loss associated with ringing in the ears.  - Referral for audiology testing to assess hearing loss.    Skin tag  - Skin tags present, removed during visit.     Hypertension  - Monitor blood pressure due to potential increase from bupropion. Blood pressure today is 132/80 mmHg.        I have discontinued [place] Park's ondansetron and semaglutide-weight management. I am also having her start on buPROPion and naltrexone. Additionally, I am having her maintain her magnesium, cholecalciferol, zinc gluconate, Hair Skin & Nails, senna-docusate, lisinopril, and atorvastatin.        Orders Placed This Encounter   Procedures    Removal of up to 15 skin tags    Adult Audiology  Referral       Follow up: Return in about 1 year (around 5/22/2026) for AWV. Sooner if needed.    The longitudinal plan of care for the diagnosis(es)/condition(s) as documented were addressed during this visit. Due to the added complexity in care, I will continue to support [place] in the subsequent management and with ongoing continuity of care.    Anusha Fermin, DNP, APRN, CNP   Electronically signed   Answers submitted by the patient for this visit:  General Questionnaire (Submitted on 5/22/2025)  Chief Complaint: Chronic problems general questions HPI Form  What is the reason for your visit today? : remove skin tags  How many servings of fruits and vegetables do you eat daily?: 2-3  On average, how many sweetened beverages do you drink each day (Examples: soda, juice, sweet tea, etc.  Do NOT count diet or artificially sweetened beverages)?: 0  How many minutes a day do you exercise enough to make your heart beat faster?: 20 to 29  How many days a week do you exercise enough to make your heart beat faster?: 4  How many days per week do you miss taking your medication?: 0  Questionnaire about: Chronic problems general questions HPI Form (Submitted on 5/22/2025)  Chief Complaint: Chronic problems general questions HPI Form

## 2025-05-26 ENCOUNTER — PATIENT OUTREACH (OUTPATIENT)
Dept: CARE COORDINATION | Facility: CLINIC | Age: 65
End: 2025-05-26
Payer: MEDICARE

## 2025-08-13 ENCOUNTER — MYC MEDICAL ADVICE (OUTPATIENT)
Dept: INTERNAL MEDICINE | Facility: CLINIC | Age: 65
End: 2025-08-13
Payer: MEDICARE

## 2025-08-13 DIAGNOSIS — E66.3 OVERWEIGHT (BMI 25.0-29.9): ICD-10-CM

## 2025-08-18 RX ORDER — NALTREXONE HYDROCHLORIDE 50 MG/1
25 TABLET, FILM COATED ORAL DAILY
Qty: 45 TABLET | Refills: 0 | Status: SHIPPED | OUTPATIENT
Start: 2025-08-18

## 2025-08-18 RX ORDER — BUPROPION HYDROCHLORIDE 150 MG/1
150 TABLET ORAL EVERY MORNING
Qty: 90 TABLET | Refills: 0 | Status: SHIPPED | OUTPATIENT
Start: 2025-08-18

## 2025-08-26 ENCOUNTER — OFFICE VISIT (OUTPATIENT)
Dept: AUDIOLOGY | Facility: CLINIC | Age: 65
End: 2025-08-26
Attending: NURSE PRACTITIONER
Payer: MEDICARE

## 2025-08-26 ENCOUNTER — OFFICE VISIT (OUTPATIENT)
Dept: INTERNAL MEDICINE | Facility: CLINIC | Age: 65
End: 2025-08-26
Payer: MEDICARE

## 2025-08-26 VITALS
BODY MASS INDEX: 25.68 KG/M2 | OXYGEN SATURATION: 98 % | SYSTOLIC BLOOD PRESSURE: 137 MMHG | RESPIRATION RATE: 16 BRPM | HEIGHT: 64 IN | TEMPERATURE: 97.8 F | HEART RATE: 69 BPM | WEIGHT: 150.4 LBS | DIASTOLIC BLOOD PRESSURE: 84 MMHG

## 2025-08-26 DIAGNOSIS — L30.1 DYSHIDROTIC ECZEMA: ICD-10-CM

## 2025-08-26 DIAGNOSIS — H90.3 BILATERAL SENSORINEURAL HEARING LOSS: ICD-10-CM

## 2025-08-26 DIAGNOSIS — E66.3 OVERWEIGHT (BMI 25.0-29.9): Primary | ICD-10-CM

## 2025-08-26 LAB
ALBUMIN SERPL BCG-MCNC: 4.4 G/DL (ref 3.5–5.2)
ALP SERPL-CCNC: 81 U/L (ref 40–150)
ALT SERPL W P-5'-P-CCNC: 23 U/L (ref 0–50)
AST SERPL W P-5'-P-CCNC: 21 U/L (ref 0–45)
BILIRUB SERPL-MCNC: 0.6 MG/DL
BILIRUBIN DIRECT (ROCHE PRO & PURE): 0.21 MG/DL (ref 0–0.45)
PROT SERPL-MCNC: 6.9 G/DL (ref 6.4–8.3)

## 2025-08-26 PROCEDURE — 3075F SYST BP GE 130 - 139MM HG: CPT | Performed by: NURSE PRACTITIONER

## 2025-08-26 PROCEDURE — 1126F AMNT PAIN NOTED NONE PRSNT: CPT | Performed by: NURSE PRACTITIONER

## 2025-08-26 PROCEDURE — 36415 COLL VENOUS BLD VENIPUNCTURE: CPT | Performed by: NURSE PRACTITIONER

## 2025-08-26 PROCEDURE — G2211 COMPLEX E/M VISIT ADD ON: HCPCS | Performed by: NURSE PRACTITIONER

## 2025-08-26 PROCEDURE — 3079F DIAST BP 80-89 MM HG: CPT | Performed by: NURSE PRACTITIONER

## 2025-08-26 PROCEDURE — 99213 OFFICE O/P EST LOW 20 MIN: CPT | Performed by: NURSE PRACTITIONER

## 2025-08-26 PROCEDURE — 80076 HEPATIC FUNCTION PANEL: CPT | Performed by: NURSE PRACTITIONER

## 2025-08-26 RX ORDER — TRIAMCINOLONE ACETONIDE 1 MG/G
OINTMENT TOPICAL
Qty: 30 G | Refills: 0 | Status: SHIPPED | OUTPATIENT
Start: 2025-08-26

## 2025-08-26 ASSESSMENT — PAIN SCALES - GENERAL: PAINLEVEL_OUTOF10: NO PAIN (0)

## (undated) DEVICE — PREP CHLORAPREP 26ML TINTED ORANGE  260815

## (undated) DEVICE — ESU PENCIL SMOKE EVAC W/ROCKER SWITCH 0703-047-000

## (undated) DEVICE — GOWN XLG DISP 9545

## (undated) DEVICE — DRAPE IOBAN INCISE 23X17" 6650EZ

## (undated) DEVICE — GLOVE BIOGEL PI MICRO SZ 7.0 48570

## (undated) DEVICE — GLOVE BIOGEL PI MICRO INDICATOR UNDERGLOVE SZ 7.5 48975

## (undated) DEVICE — DRAPE U SPLIT 74X120" 29440

## (undated) DEVICE — SU MONOCRYL 2-0 SH 27" UND Y417H

## (undated) DEVICE — DRAPE SHEET HALF 40X60" 9358

## (undated) DEVICE — BNDG ELASTIC 6" DBL LENGTH UNSTERILE 6611-16

## (undated) DEVICE — SU VICRYL 2-0 TIE 12X18" J905T

## (undated) DEVICE — SOL WATER IRRIG 1000ML BOTTLE 07139-09

## (undated) DEVICE — BLADE KNIFE SURG 10 371110

## (undated) DEVICE — SU DERMABOND PRINEO 42CM CLR422US

## (undated) DEVICE — SU PLAIN FAST ABSORB 5-0 PC-1 18" 1915G

## (undated) DEVICE — STPL SKIN 35W 6.9MM  PXW35

## (undated) DEVICE — SPONGE LAP 18X18" 1515

## (undated) DEVICE — DRSG KERLIX 4 1/2"X4YDS ROLL 6715

## (undated) DEVICE — DRSG STERI STRIP 1/2X4" B1557

## (undated) DEVICE — ESU ELEC BLADE HEX-LOCKING 2.5" E1450X

## (undated) DEVICE — DRSG ABDOMINAL 07 1/2X8" 7197D

## (undated) DEVICE — PACK MINOR SBA15MIFSE

## (undated) DEVICE — SU STRATAFIX MONOCRYL 3-0 SPIRAL PS-2 45CM SXMP1B107

## (undated) DEVICE — SUCTION TIP YANKAUER W/O VENT K86

## (undated) RX ORDER — BUPIVACAINE HYDROCHLORIDE 2.5 MG/ML
INJECTION, SOLUTION EPIDURAL; INFILTRATION; INTRACAUDAL
Status: DISPENSED
Start: 2024-03-08

## (undated) RX ORDER — ACETAMINOPHEN 325 MG/1
TABLET ORAL
Status: DISPENSED
Start: 2024-03-08

## (undated) RX ORDER — ONDANSETRON 2 MG/ML
INJECTION INTRAMUSCULAR; INTRAVENOUS
Status: DISPENSED
Start: 2024-03-08

## (undated) RX ORDER — EPHEDRINE SULFATE 50 MG/ML
INJECTION, SOLUTION INTRAMUSCULAR; INTRAVENOUS; SUBCUTANEOUS
Status: DISPENSED
Start: 2024-03-08

## (undated) RX ORDER — FENTANYL CITRATE 50 UG/ML
INJECTION, SOLUTION INTRAMUSCULAR; INTRAVENOUS
Status: DISPENSED
Start: 2024-03-08

## (undated) RX ORDER — OXYCODONE HYDROCHLORIDE 5 MG/1
TABLET ORAL
Status: DISPENSED
Start: 2024-03-08

## (undated) RX ORDER — FENTANYL CITRATE 0.05 MG/ML
INJECTION, SOLUTION INTRAMUSCULAR; INTRAVENOUS
Status: DISPENSED
Start: 2024-03-08

## (undated) RX ORDER — CEFAZOLIN SODIUM 2 G/50ML
SOLUTION INTRAVENOUS
Status: DISPENSED
Start: 2024-03-08

## (undated) RX ORDER — DEXAMETHASONE SODIUM PHOSPHATE 4 MG/ML
INJECTION, SOLUTION INTRA-ARTICULAR; INTRALESIONAL; INTRAMUSCULAR; INTRAVENOUS; SOFT TISSUE
Status: DISPENSED
Start: 2024-03-08